# Patient Record
Sex: FEMALE | Race: BLACK OR AFRICAN AMERICAN | NOT HISPANIC OR LATINO | Employment: UNEMPLOYED | URBAN - METROPOLITAN AREA
[De-identification: names, ages, dates, MRNs, and addresses within clinical notes are randomized per-mention and may not be internally consistent; named-entity substitution may affect disease eponyms.]

---

## 2021-04-30 ENCOUNTER — TELEPHONE (OUTPATIENT)
Dept: OBSTETRICS AND GYNECOLOGY | Facility: CLINIC | Age: 31
End: 2021-04-30

## 2021-06-17 ENCOUNTER — OFFICE VISIT (OUTPATIENT)
Dept: OBSTETRICS AND GYNECOLOGY | Facility: CLINIC | Age: 31
End: 2021-06-17
Payer: MEDICAID

## 2021-06-17 VITALS
DIASTOLIC BLOOD PRESSURE: 61 MMHG | SYSTOLIC BLOOD PRESSURE: 109 MMHG | WEIGHT: 116.38 LBS | HEIGHT: 67 IN | BODY MASS INDEX: 18.27 KG/M2

## 2021-06-17 DIAGNOSIS — N94.6 DYSMENORRHEA: ICD-10-CM

## 2021-06-17 DIAGNOSIS — N92.0 MENORRHAGIA WITH REGULAR CYCLE: Primary | ICD-10-CM

## 2021-06-17 PROCEDURE — 99213 PR OFFICE/OUTPT VISIT, EST, LEVL III, 20-29 MIN: ICD-10-PCS | Mod: S$GLB,,, | Performed by: NURSE PRACTITIONER

## 2021-06-17 PROCEDURE — 99213 OFFICE O/P EST LOW 20 MIN: CPT | Mod: S$GLB,,, | Performed by: NURSE PRACTITIONER

## 2021-06-18 ENCOUNTER — PATIENT MESSAGE (OUTPATIENT)
Dept: OBSTETRICS AND GYNECOLOGY | Facility: CLINIC | Age: 31
End: 2021-06-18

## 2021-06-25 ENCOUNTER — TELEPHONE (OUTPATIENT)
Dept: OBSTETRICS AND GYNECOLOGY | Facility: CLINIC | Age: 31
End: 2021-06-25

## 2021-06-30 ENCOUNTER — TELEPHONE (OUTPATIENT)
Dept: OBSTETRICS AND GYNECOLOGY | Facility: CLINIC | Age: 31
End: 2021-06-30

## 2021-07-15 ENCOUNTER — TELEPHONE (OUTPATIENT)
Dept: OBSTETRICS AND GYNECOLOGY | Facility: CLINIC | Age: 31
End: 2021-07-15

## 2021-07-16 ENCOUNTER — OUTSIDE PLACE OF SERVICE (OUTPATIENT)
Dept: OBSTETRICS AND GYNECOLOGY | Facility: CLINIC | Age: 31
End: 2021-07-16

## 2021-07-16 PROCEDURE — 58563 HYSTEROSCOPY ABLATION: CPT | Mod: ,,, | Performed by: OBSTETRICS & GYNECOLOGY

## 2021-07-16 PROCEDURE — 58563 PR HYSTEROSCOPY,W/ENDOMETRIAL ABLATION: ICD-10-PCS | Mod: ,,, | Performed by: OBSTETRICS & GYNECOLOGY

## 2021-07-19 ENCOUNTER — PATIENT MESSAGE (OUTPATIENT)
Dept: OBSTETRICS AND GYNECOLOGY | Facility: CLINIC | Age: 31
End: 2021-07-19

## 2021-07-20 ENCOUNTER — PATIENT MESSAGE (OUTPATIENT)
Dept: OBSTETRICS AND GYNECOLOGY | Facility: CLINIC | Age: 31
End: 2021-07-20

## 2021-07-20 DIAGNOSIS — R30.0 DYSURIA: Primary | ICD-10-CM

## 2021-07-20 RX ORDER — METRONIDAZOLE 500 MG/1
500 TABLET ORAL EVERY 12 HOURS
Qty: 14 TABLET | Refills: 0 | Status: SHIPPED | OUTPATIENT
Start: 2021-07-20 | End: 2021-07-26 | Stop reason: SDUPTHER

## 2021-07-23 ENCOUNTER — PATIENT MESSAGE (OUTPATIENT)
Dept: OBSTETRICS AND GYNECOLOGY | Facility: CLINIC | Age: 31
End: 2021-07-23

## 2021-07-26 RX ORDER — METRONIDAZOLE 500 MG/1
500 TABLET ORAL EVERY 12 HOURS
Qty: 14 TABLET | Refills: 0 | Status: SHIPPED | OUTPATIENT
Start: 2021-07-26 | End: 2021-08-02

## 2021-07-27 ENCOUNTER — PATIENT MESSAGE (OUTPATIENT)
Dept: OBSTETRICS AND GYNECOLOGY | Facility: CLINIC | Age: 31
End: 2021-07-27

## 2021-07-27 RX ORDER — FLUCONAZOLE 150 MG/1
TABLET ORAL
Qty: 2 TABLET | Refills: 1 | Status: SHIPPED | OUTPATIENT
Start: 2021-07-27 | End: 2022-02-14

## 2021-07-28 ENCOUNTER — TELEPHONE (OUTPATIENT)
Dept: OBSTETRICS AND GYNECOLOGY | Facility: CLINIC | Age: 31
End: 2021-07-28

## 2021-07-28 ENCOUNTER — CLINICAL SUPPORT (OUTPATIENT)
Dept: OBSTETRICS AND GYNECOLOGY | Facility: CLINIC | Age: 31
End: 2021-07-28
Payer: MEDICAID

## 2021-07-28 DIAGNOSIS — R30.0 DYSURIA: Primary | ICD-10-CM

## 2021-07-28 PROCEDURE — 87086 URINE CULTURE/COLONY COUNT: CPT | Performed by: NURSE PRACTITIONER

## 2021-07-28 RX ORDER — NITROFURANTOIN 25; 75 MG/1; MG/1
100 CAPSULE ORAL 2 TIMES DAILY
Qty: 14 CAPSULE | Refills: 0 | Status: SHIPPED | OUTPATIENT
Start: 2021-07-28 | End: 2021-08-04

## 2021-07-30 LAB — BACTERIA UR CULT: NORMAL

## 2021-08-02 ENCOUNTER — OFFICE VISIT (OUTPATIENT)
Dept: OBSTETRICS AND GYNECOLOGY | Facility: CLINIC | Age: 31
End: 2021-08-02
Payer: MEDICAID

## 2021-08-02 VITALS
HEIGHT: 64 IN | BODY MASS INDEX: 19.97 KG/M2 | SYSTOLIC BLOOD PRESSURE: 110 MMHG | DIASTOLIC BLOOD PRESSURE: 68 MMHG | WEIGHT: 117 LBS

## 2021-08-02 DIAGNOSIS — Z09 EXAMINATION FOLLOWING SURGERY: Primary | ICD-10-CM

## 2021-08-02 DIAGNOSIS — N92.0 MENORRHAGIA WITH REGULAR CYCLE: ICD-10-CM

## 2021-08-02 PROCEDURE — 99024 POSTOP FOLLOW-UP VISIT: CPT | Mod: S$GLB,,, | Performed by: OBSTETRICS & GYNECOLOGY

## 2021-08-02 PROCEDURE — 99024 PR POST-OP FOLLOW-UP VISIT: ICD-10-PCS | Mod: S$GLB,,, | Performed by: OBSTETRICS & GYNECOLOGY

## 2021-08-05 ENCOUNTER — PATIENT MESSAGE (OUTPATIENT)
Dept: OBSTETRICS AND GYNECOLOGY | Facility: CLINIC | Age: 31
End: 2021-08-05

## 2021-08-16 ENCOUNTER — PATIENT MESSAGE (OUTPATIENT)
Dept: OBSTETRICS AND GYNECOLOGY | Facility: CLINIC | Age: 31
End: 2021-08-16

## 2021-09-11 ENCOUNTER — PATIENT MESSAGE (OUTPATIENT)
Dept: OBSTETRICS AND GYNECOLOGY | Facility: CLINIC | Age: 31
End: 2021-09-11

## 2021-09-13 ENCOUNTER — PATIENT MESSAGE (OUTPATIENT)
Dept: OBSTETRICS AND GYNECOLOGY | Facility: CLINIC | Age: 31
End: 2021-09-13

## 2021-10-07 ENCOUNTER — PATIENT MESSAGE (OUTPATIENT)
Dept: OBSTETRICS AND GYNECOLOGY | Facility: CLINIC | Age: 31
End: 2021-10-07

## 2021-12-01 ENCOUNTER — PATIENT MESSAGE (OUTPATIENT)
Dept: OBSTETRICS AND GYNECOLOGY | Facility: CLINIC | Age: 31
End: 2021-12-01
Payer: COMMERCIAL

## 2021-12-10 ENCOUNTER — TELEPHONE (OUTPATIENT)
Dept: OBSTETRICS AND GYNECOLOGY | Facility: CLINIC | Age: 31
End: 2021-12-10
Payer: COMMERCIAL

## 2021-12-16 ENCOUNTER — PATIENT MESSAGE (OUTPATIENT)
Dept: OBSTETRICS AND GYNECOLOGY | Facility: CLINIC | Age: 31
End: 2021-12-16
Payer: COMMERCIAL

## 2021-12-27 ENCOUNTER — TELEPHONE (OUTPATIENT)
Dept: OBSTETRICS AND GYNECOLOGY | Facility: CLINIC | Age: 31
End: 2021-12-27
Payer: COMMERCIAL

## 2022-01-07 ENCOUNTER — PATIENT MESSAGE (OUTPATIENT)
Dept: OBSTETRICS AND GYNECOLOGY | Facility: CLINIC | Age: 32
End: 2022-01-07
Payer: COMMERCIAL

## 2022-01-10 ENCOUNTER — PATIENT MESSAGE (OUTPATIENT)
Dept: OBSTETRICS AND GYNECOLOGY | Facility: CLINIC | Age: 32
End: 2022-01-10
Payer: COMMERCIAL

## 2022-01-10 ENCOUNTER — TELEPHONE (OUTPATIENT)
Dept: OBSTETRICS AND GYNECOLOGY | Facility: CLINIC | Age: 32
End: 2022-01-10
Payer: COMMERCIAL

## 2022-01-10 NOTE — TELEPHONE ENCOUNTER
----- Message from Niki Dorsey sent at 1/10/2022 11:41 AM CST -----  Contact: pt  Type: Needs Medical Advice         Who Called: pt  Best Call Back Number: [phone]  Additional Information: Requesting a call back regarding pt is having abdominal  pain with intercourse. Pt said pain is a 10 of 10 and would like to be seen before her next appt. Pt would like office to call her back.       Please Advise- Thank you

## 2022-01-12 ENCOUNTER — OFFICE VISIT (OUTPATIENT)
Dept: OBSTETRICS AND GYNECOLOGY | Facility: CLINIC | Age: 32
End: 2022-01-12
Payer: COMMERCIAL

## 2022-01-12 VITALS
HEIGHT: 66 IN | WEIGHT: 122.63 LBS | DIASTOLIC BLOOD PRESSURE: 58 MMHG | BODY MASS INDEX: 19.71 KG/M2 | SYSTOLIC BLOOD PRESSURE: 104 MMHG

## 2022-01-12 DIAGNOSIS — A64 STD (SEXUALLY TRANSMITTED DISEASE): Primary | ICD-10-CM

## 2022-01-12 DIAGNOSIS — N73.9 PELVIC INFLAMMATORY DISEASE: ICD-10-CM

## 2022-01-12 PROCEDURE — 87591 N.GONORRHOEAE DNA AMP PROB: CPT | Performed by: OBSTETRICS & GYNECOLOGY

## 2022-01-12 PROCEDURE — 87210 SMEAR WET MOUNT SALINE/INK: CPT | Mod: QW,S$GLB,, | Performed by: OBSTETRICS & GYNECOLOGY

## 2022-01-12 PROCEDURE — 99213 OFFICE O/P EST LOW 20 MIN: CPT | Mod: S$GLB,,, | Performed by: OBSTETRICS & GYNECOLOGY

## 2022-01-12 PROCEDURE — 87491 CHLMYD TRACH DNA AMP PROBE: CPT | Performed by: OBSTETRICS & GYNECOLOGY

## 2022-01-12 PROCEDURE — 87210 PR  SMEAR,STAIN,WET MNT,INTERP: ICD-10-PCS | Mod: QW,S$GLB,, | Performed by: OBSTETRICS & GYNECOLOGY

## 2022-01-12 PROCEDURE — 99213 PR OFFICE/OUTPT VISIT, EST, LEVL III, 20-29 MIN: ICD-10-PCS | Mod: S$GLB,,, | Performed by: OBSTETRICS & GYNECOLOGY

## 2022-01-12 RX ORDER — DOXYCYCLINE HYCLATE 100 MG
100 TABLET ORAL EVERY 12 HOURS
Qty: 28 TABLET | Refills: 0 | Status: SHIPPED | OUTPATIENT
Start: 2022-01-12 | End: 2022-02-14 | Stop reason: ALTCHOICE

## 2022-01-12 RX ORDER — CEFTRIAXONE 500 MG/1
500 INJECTION, POWDER, FOR SOLUTION INTRAMUSCULAR; INTRAVENOUS
Status: DISCONTINUED | OUTPATIENT
Start: 2022-01-12 | End: 2022-03-16

## 2022-01-12 NOTE — PROGRESS NOTES
Patient ID: Alla Andre is a 31 y.o. female.    Chief Complaint:  Pelvic Pain (Pt has been experiencing pelvic and back pain for one week before/ after intercourse. Pt had  ablation procedure . LMP unknown)      History of Present Illness  HPI  Patient states she started having pain about a week ago in her lower abdomen.  Pain is worse with intercourse and deep penetration.  She does report vaginal discharge after intercourse.  There is a slight odor to it.  No nausea or vomiting.  No fevers.  She had an endometrial ablation and has not had any bleeding since she does report some mild spotting after intercourse as well.  The pain is in her lower abdomen diffusely.  Crampy at times but sharp when palpated or during intercourse.  She is sexually active with 1 partner.  She does not suspect any infidelity from her partner either.    GYN & OB History  No LMP recorded (lmp unknown). Patient has had an ablation.   Date of Last Pap: No result found    Past Medical History:   Diagnosis Date    Abnormal Pap smear of cervix      Past Surgical History:   Procedure Laterality Date    ABLATION      2021    TUBAL LIGATION       Review of patient's allergies indicates:   Allergen Reactions    Shellfish containing products      Other reaction(s): Swelling     Current Outpatient Medications on File Prior to Visit   Medication Sig Dispense Refill    fluconazole (DIFLUCAN) 150 MG Tab 1 po Now may repeat in 72 hr (Patient not taking: No sig reported) 2 tablet 1     No current facility-administered medications on file prior to visit.       OB History    Para Term  AB Living   1 1 1     1   SAB IAB Ectopic Multiple Live Births           1      # Outcome Date GA Lbr Roosevelt/2nd Weight Sex Delivery Anes PTL Lv   1 Term [birth year]   3.26 kg (7 lb 3 oz) F Vag-Spont EPI  SILVIO       Review of Systems  Review of Systems   Constitutional: Negative for fatigue, fever and unexpected weight change.   HENT: Negative for  "nasal congestion.    Respiratory: Negative for cough and shortness of breath.    Cardiovascular: Negative for chest pain, palpitations and leg swelling.   Gastrointestinal: Negative for abdominal pain, constipation, diarrhea, nausea, vomiting and reflux.   Endocrine: Negative for diabetes, hair loss and hot flashes.   Genitourinary: Positive for pelvic pain and vaginal discharge. Negative for bladder incontinence, decreased libido, dysmenorrhea, dyspareunia, dysuria, hot flashes, menorrhagia, menstrual problem and vaginal dryness.   Musculoskeletal: Negative for arthralgias, back pain and myalgias.   Integumentary:  Negative for rash, acne, hair changes, mole/lesion, breast mass, nipple discharge, breast skin changes and breast tenderness.   Neurological: Negative for seizures, syncope and headaches.   Hematological: Negative for adenopathy. Does not bruise/bleed easily.   Psychiatric/Behavioral: Negative for depression and sleep disturbance. The patient is not nervous/anxious.    Breast: Negative for breast self exam, lump, mass, mastodynia, nipple discharge, skin changes and tenderness              Objective:   BP (!) 104/58   Ht 5' 6" (1.676 m)   Wt 55.6 kg (122 lb 9.6 oz)   LMP  (LMP Unknown)   BMI 19.79 kg/m²        Physical Exam:   Constitutional: She is oriented to person, place, and time. She appears well-developed and well-nourished. No distress.    HENT:   Head: Normocephalic and atraumatic.   Nose: Nose normal.            Abdominal: Soft. She exhibits no distension and no mass. There is no abdominal tenderness.             Musculoskeletal: Normal range of motion and moves all extremeties. No edema.       Neurological: She is alert and oriented to person, place, and time. No cranial nerve deficit.     Psychiatric: She has a normal mood and affect. Her behavior is normal. Judgment and thought content normal.   :  Normal external female genitalia, cervix midline without lesions, uterus normal position " and size, no adnexal masses. Vaginal discharge noted, cervical motion tenderness    Wet prep with multiple WBC noted, no trich or clue cells.          Assessment:      1. STD (sexually transmitted disease)    2. Pelvic inflammatory disease          Plan:      Orders Placed This Encounter   Procedures    C. trachomatis/N. gonorrhoeae by AMP DNA Other; Urine     Sources by Resulting Lab:->Other  Release to patient->Immediate     Order Specific Question:   Sources by Resulting Lab:     Answer:   Other     Order Specific Question:   Source:     Answer:   Urine     Order Specific Question:   Release to patient     Answer:   Immediate        patient with suspected PID.  Will treat with Rocephin and discharge with doxycycline home.  Precautions for nausea vomiting and compliance was discussed with the patient expressed understanding.  Return 1 week.  Consider ultrasound if no improvement.  Follow-up urine cultures.

## 2022-01-14 ENCOUNTER — PATIENT MESSAGE (OUTPATIENT)
Dept: OBSTETRICS AND GYNECOLOGY | Facility: CLINIC | Age: 32
End: 2022-01-14
Payer: COMMERCIAL

## 2022-01-18 LAB
C TRACH DNA SPEC QL NAA+PROBE: NOT DETECTED
N GONORRHOEA DNA SPEC QL NAA+PROBE: NOT DETECTED

## 2022-01-18 NOTE — TELEPHONE ENCOUNTER
LVM stating that I was following up on her [employer] message wanting to know what tests she is referring to and to call us back if she has any further questions.

## 2022-02-03 ENCOUNTER — PATIENT MESSAGE (OUTPATIENT)
Dept: OBSTETRICS AND GYNECOLOGY | Facility: CLINIC | Age: 32
End: 2022-02-03
Payer: COMMERCIAL

## 2022-02-08 ENCOUNTER — PATIENT MESSAGE (OUTPATIENT)
Dept: OBSTETRICS AND GYNECOLOGY | Facility: CLINIC | Age: 32
End: 2022-02-08

## 2022-02-09 NOTE — TELEPHONE ENCOUNTER
Please schedule patient to come see me at next available.  She is scheduled to see Paulina on 2/21.  She needs a UPT and TVUS ASAP.

## 2022-02-10 ENCOUNTER — PATIENT MESSAGE (OUTPATIENT)
Dept: OBSTETRICS AND GYNECOLOGY | Facility: CLINIC | Age: 32
End: 2022-02-10
Payer: COMMERCIAL

## 2022-02-14 ENCOUNTER — PROCEDURE VISIT (OUTPATIENT)
Dept: OBSTETRICS AND GYNECOLOGY | Facility: CLINIC | Age: 32
End: 2022-02-14
Payer: COMMERCIAL

## 2022-02-14 ENCOUNTER — OFFICE VISIT (OUTPATIENT)
Dept: OBSTETRICS AND GYNECOLOGY | Facility: CLINIC | Age: 32
End: 2022-02-14
Payer: COMMERCIAL

## 2022-02-14 VITALS
SYSTOLIC BLOOD PRESSURE: 106 MMHG | BODY MASS INDEX: 19.38 KG/M2 | WEIGHT: 120.56 LBS | HEIGHT: 66 IN | DIASTOLIC BLOOD PRESSURE: 65 MMHG

## 2022-02-14 DIAGNOSIS — N30.01 ACUTE CYSTITIS WITH HEMATURIA: ICD-10-CM

## 2022-02-14 DIAGNOSIS — R19.8 ABDOMINAL GUARDING: ICD-10-CM

## 2022-02-14 DIAGNOSIS — R10.2 PELVIC PAIN: ICD-10-CM

## 2022-02-14 DIAGNOSIS — R10.2 PELVIC PAIN: Primary | ICD-10-CM

## 2022-02-14 PROBLEM — A63.0 ANOGENITAL HUMAN PAPILLOMA VIRUS (HPV) INFECTION: Status: ACTIVE | Noted: 2022-02-14

## 2022-02-14 PROBLEM — E78.00 HYPERCHOLESTEROLEMIA: Status: ACTIVE | Noted: 2022-02-14

## 2022-02-14 LAB
B-HCG UR QL: NEGATIVE
BILIRUB SERPL-MCNC: NEGATIVE MG/DL
BLOOD URINE, POC: ABNORMAL
COLOR, POC UA: YELLOW
CTP QC/QA: YES
GLUCOSE UR QL STRIP: NEGATIVE
KETONES UR QL STRIP: NEGATIVE
LEUKOCYTE ESTERASE URINE, POC: ABNORMAL
NITRITE, POC UA: NEGATIVE
PH, POC UA: 5.5
PROTEIN, POC: NEGATIVE
SPECIFIC GRAVITY, POC UA: 1.03
UROBILINOGEN, POC UA: 0.2

## 2022-02-14 PROCEDURE — 76830 TRANSVAGINAL US NON-OB: CPT | Mod: S$GLB,,, | Performed by: OBSTETRICS & GYNECOLOGY

## 2022-02-14 PROCEDURE — 81025 URINE PREGNANCY TEST: CPT | Mod: S$GLB,,,

## 2022-02-14 PROCEDURE — 81025 POCT URINE PREGNANCY: ICD-10-PCS | Mod: S$GLB,,,

## 2022-02-14 PROCEDURE — 81001 POCT URINALYSIS, DIPSTICK OR TABLET REAGENT, AUTOMATED, WITH MICROSCOP: ICD-10-PCS | Mod: S$GLB,,,

## 2022-02-14 PROCEDURE — 76830 US OB/GYN PROCEDURE (VIEWPOINT): ICD-10-PCS | Mod: S$GLB,,, | Performed by: OBSTETRICS & GYNECOLOGY

## 2022-02-14 PROCEDURE — 99215 PR OFFICE/OUTPT VISIT, EST, LEVL V, 40-54 MIN: ICD-10-PCS | Mod: 25,S$GLB,,

## 2022-02-14 PROCEDURE — 81001 URINALYSIS AUTO W/SCOPE: CPT | Mod: S$GLB,,,

## 2022-02-14 PROCEDURE — 99215 OFFICE O/P EST HI 40 MIN: CPT | Mod: 25,S$GLB,,

## 2022-02-14 RX ORDER — METRONIDAZOLE 500 MG/1
500 TABLET ORAL 2 TIMES DAILY
Qty: 28 TABLET | Refills: 0 | Status: SHIPPED | OUTPATIENT
Start: 2022-02-14 | End: 2022-02-28

## 2022-02-14 RX ORDER — CEFUROXIME AXETIL 500 MG/1
500 TABLET ORAL 2 TIMES DAILY
COMMUNITY
Start: 2022-02-09 | End: 2022-02-16

## 2022-02-14 NOTE — PROGRESS NOTES
SUBJECTIVE:       Chief Complaint: Possible Pregnancy    History of Present Illness: Sury Andre is a 31 y.o. female  presenting for persistent pelvic pain.  Patient does have a history of STD/STI.  She does not desire STD/STI testing today.  She states she feels safe at home.  She has a BTL+GTA for contraception as of 2021.  She was recently seen at [facility] for a UTI.  She states she is still taking antibiotics from this encounter.  Patient was given antibiotics for PIC in early January by Dr. Gallardo and did not follow-up as recommended.  She reports diffuse pelvic pain and pain with sex.  She describes the pain as moderate aching and stabbing.    Review of Systems   Constitutional: Negative for activity change, appetite change, chills, fatigue, fever and unexpected weight change.   HENT: Negative for nasal congestion, dental problem, sinus pressure/congestion and sore throat.    Eyes: Negative for discharge, visual disturbance and eye dryness.   Respiratory: Negative for cough, chest tightness and shortness of breath.    Cardiovascular: Negative for chest pain, palpitations and leg swelling.   Gastrointestinal: Negative for abdominal distention, abdominal pain, change in bowel habit, constipation, diarrhea, nausea, vomiting, reflux and change in bowel habit.   Endocrine: Negative for cold intolerance, heat intolerance, polydipsia and polyuria.   Genitourinary: Positive for dyspareunia and pelvic pain. Negative for difficulty urinating, dysuria, flank pain, frequency, urgency, vaginal bleeding, vaginal discharge, vaginal pain and vaginal dryness.   Musculoskeletal: Negative for back pain and myalgias.   Integumentary:  Negative for rash, breast mass, breast discharge and breast tenderness.   Allergic/Immunologic: Negative for immunocompromised state.   Neurological: Negative for dizziness, syncope, weakness, light-headedness, numbness and headaches.   Hematological: Negative for adenopathy.  "Does not bruise/bleed easily.   Psychiatric/Behavioral: Negative for sleep disturbance. The patient is not nervous/anxious.    Breast: Negative for mass and tenderness        OBJECTIVE:      /65 (BP Location: Left arm, Patient Position: Sitting)   Ht 5' 6" (1.676 m)   Wt 54.7 kg (120 lb 9.5 oz)   LMP 06/21/2021 (Exact Date)   BMI 19.46 kg/m²     Chaperone present.    Physical Exam:   Constitutional: She is oriented to person, place, and time. Vital signs are normal. She appears well-developed and well-nourished. She is cooperative.    HENT:   Head: Normocephalic and atraumatic.      Cardiovascular: Normal rate and regular rhythm.     Pulmonary/Chest: Effort normal.        Abdominal: Soft. She exhibits no distension. There is abdominal tenderness in the right lower quadrant, suprapubic area and left lower quadrant. There is guarding.             Musculoskeletal: Moves all extremeties.      Right lower leg: No edema.      Left lower leg: No edema.       Neurological: She is alert and oriented to person, place, and time. GCS eye subscore is 4. GCS verbal subscore is 5. GCS motor subscore is 6.    Skin: Skin is warm and dry. Capillary refill takes less than 2 seconds.    Psychiatric: Her speech is normal and behavior is normal. Mood, affect and thought content normal.         ASSESSMENT:       1. Pelvic pain    2. Abdominal guarding    3. Acute cystitis with hematuria          PLAN:     Pelvic pain  -     US OB/GYN Procedure ([facility])-Future; Future; Expected date: 02/14/2022  -     POCT urine pregnancy  -     metroNIDAZOLE (FLAGYL) 500 MG tablet; Take 1 tablet (500 mg total) by mouth 2 (two) times daily. for 14 days  Dispense: 28 tablet; Refill: 0    Abdominal guarding    Acute cystitis with hematuria  -     POCT URINE DIPSTICK WITH MICROSCOPE, AUTOMATED    Ultrasound today to rule out TOA.  Discussed importance of taking antibiotic therapy as prescribed for the full dose.  Advised patient to abstain from " alcohol while taking metronidazole.  Instructed patient to notify provider and/or go to ED if fever, chills, or worsening pelvic pain while taking antibiotics.  If pelvic pain persists or is worse on metronidazole by end of week, will admit to [facility] for IV antibiotics for PID per Dr. Gallardo.  Reassurance provided.  All questions answered.  Patient verbalized understanding.      Follow up in about 3 days (around 2/17/2022) for evaluation of symptoms.

## 2022-02-14 NOTE — PATIENT INSTRUCTIONS
Patient Education       Pelvic Pain   About this topic   Pelvic pain is pain below the belly button and above the legs. It may be acute and last a few hours or a few days or much longer. If the pain lasts longer than 3 months it is chronic pelvic pain.  Your pain may be sharp, dull, or cramping. It may be there all the time or may come and go. Sometimes, the pain builds up over a short time. You may feel the pain throughout this area of your body or in one specific area. It may be mild, moderate, or severe.  Pain can cause upset stomach and throwing up. When you are in pain you may not feel hungry. You may feel nervous. Pain may be a warning sign that something is wrong inside the body. Acute pelvic pain may be caused by a very serious health problem.  How your pelvic pain is treated is based on many things. Some of them are the kind of pain, how bad it is, and what is causing the pain. Treatment may include drugs or surgery.  What are the causes?   Problems in the belly or bowels like:  · Blocked bowel  · Appendicitis  · Diverticulitis  · Hernia  · Hard stools  · Irritable bowel syndrome  · Peritonitis  Problems in the urinary tract like:  · Urinary tract infection  · Kidney or bladder stones  · Narrowing of the urethra  Other causes like:  · Muscle strain or spasm  · Herniated disc  · Bone problems  · Hernias  · Pelvic abscess  · Pelvic inflammatory disease  · Sexually transmitted disease  · Nerve problems  · Adhesions after surgery  · IUD is in the wrong place  Problems with internal reproductive organs like:  · Miscarriage or ectopic pregnancy  · Ovarian cyst breaks open  · Vaginal infection  · Pelvic inflammatory disease or sexually transmitted disease  · Problems with monthly periods  · Ovarian or testicular torsion  · Endometriosis or fibroids  · Ovulation pain  · Prostate problems  · Pain after a permanent birth control surgery  What are the main signs?   · Tenderness in lower belly  · Fever  · Upset stomach  and throwing up  · Dizziness  · Sweating  · Feeling anxious  How does the doctor diagnose this health problem?   Your doctor will take your history and do an exam. If you have a vagina, this will likely include a pelvic exam with a tool called a speculum. Your doctor will ask about your pain to help find where it is located. The doctor may want to do some tests to find the cause of your pain. The doctor may order:  · Lab tests  · Pelvic exam  · Ultrasound  · X-ray  · CT or MRI scan  · Intravenous pyelography  · Barium enema  · Laparoscopy  How does the doctor treat this health problem?   Treatment is based on what is causing your pain. This may include changes in your diet or physical or sexual activity.  Are there other health problems to treat?   Depending on what is causing the pain, there may be other problems to treat.  What drugs may be needed?   The doctor may order drugs to:  · Help with pain  · Fight an infection  · Balance hormones  · Relax muscles  · Lower stress and anxiety or help with depression  What can be done to prevent this health problem?   · There is nothing you can do to prevent some of these problems.  · Always practice safe sex by using condoms.  · Manage digestive problems like constipation.  Where can I learn more?   NHS Choices  http://www.nhs.uk/conditions/pelvic-pain/Pages/Introduction.aspx   Last Reviewed Date   2021-09-14  Consumer Information Use and Disclaimer   This information is not specific medical advice and does not replace information you receive from your health care provider. This is only a brief summary of general information. It does NOT include all information about conditions, illnesses, injuries, tests, procedures, treatments, therapies, discharge instructions or life-style choices that may apply to you. You must talk with your health care provider for complete information about your health and treatment options. This information should not be used to decide whether or  not to accept your health care providers advice, instructions or recommendations. Only your health care provider has the knowledge and training to provide advice that is right for you.  Copyright   Copyright © 2021 [employer] and its affiliates and/or licensors. All rights reserved.

## 2022-02-16 ENCOUNTER — PATIENT MESSAGE (OUTPATIENT)
Dept: OBSTETRICS AND GYNECOLOGY | Facility: CLINIC | Age: 32
End: 2022-02-16
Payer: COMMERCIAL

## 2022-02-16 NOTE — TELEPHONE ENCOUNTER
Attempted to reach pt to inform of Benita's instruction. No answer. LVM reading Benita's message to the pt.

## 2022-02-16 NOTE — TELEPHONE ENCOUNTER
Pt called the call center. I returned her call and verified that she was aware of Benita's instructions. Pt CLARK. Pt inquired about ED wait time, I notified the patient that it could depend on what they find and if there is a high volume of patients. Pt CLARK.

## 2022-02-17 ENCOUNTER — TELEPHONE (OUTPATIENT)
Dept: OBSTETRICS AND GYNECOLOGY | Facility: CLINIC | Age: 32
End: 2022-02-17
Payer: COMMERCIAL

## 2022-02-17 ENCOUNTER — PATIENT MESSAGE (OUTPATIENT)
Dept: OBSTETRICS AND GYNECOLOGY | Facility: CLINIC | Age: 32
End: 2022-02-17
Payer: COMMERCIAL

## 2022-02-17 DIAGNOSIS — N92.0 MENORRHAGIA WITH REGULAR CYCLE: Primary | ICD-10-CM

## 2022-02-17 NOTE — TELEPHONE ENCOUNTER
----- Message from Bibi Munoz sent at 2/17/2022  8:06 AM CST -----  Contact: pt  Type: Needs Medical Advice    Who Called:  the patient  Best Call Back Number: [phone]    Additional Information: Requesting a call back regarding went to hospital last night and saw some blood# in vagina when they did an ultra sound.   Please Advise ---Thank you

## 2022-02-18 ENCOUNTER — OFFICE VISIT (OUTPATIENT)
Dept: OBSTETRICS AND GYNECOLOGY | Facility: CLINIC | Age: 32
End: 2022-02-18
Payer: COMMERCIAL

## 2022-02-18 VITALS
SYSTOLIC BLOOD PRESSURE: 121 MMHG | HEIGHT: 66 IN | DIASTOLIC BLOOD PRESSURE: 68 MMHG | WEIGHT: 120.81 LBS | BODY MASS INDEX: 19.42 KG/M2

## 2022-02-18 DIAGNOSIS — N83.209 HEMORRHAGIC OVARIAN CYST: ICD-10-CM

## 2022-02-18 DIAGNOSIS — N92.0 MENORRHAGIA WITH REGULAR CYCLE: Primary | ICD-10-CM

## 2022-02-18 PROCEDURE — 99213 OFFICE O/P EST LOW 20 MIN: CPT | Mod: S$GLB,,,

## 2022-02-18 PROCEDURE — 99213 PR OFFICE/OUTPT VISIT, EST, LEVL III, 20-29 MIN: ICD-10-PCS | Mod: S$GLB,,,

## 2022-02-18 RX ORDER — ETONOGESTREL AND ETHINYL ESTRADIOL VAGINAL RING .015; .12 MG/D; MG/D
1 RING VAGINAL
Qty: 1 EACH | Refills: 2 | Status: SHIPPED | OUTPATIENT
Start: 2022-02-18 | End: 2022-02-18 | Stop reason: ALTCHOICE

## 2022-02-18 RX ORDER — NORELGESTROMIN AND ETHINYL ESTRADIOL 35; 150 UG/MG; UG/MG
1 PATCH TRANSDERMAL
Qty: 4 PATCH | Refills: 11 | Status: SHIPPED | OUTPATIENT
Start: 2022-02-18 | End: 2022-06-10

## 2022-02-18 RX ORDER — NAPROXEN 250 MG/1
500 TABLET ORAL
COMMUNITY
Start: 2022-02-16 | End: 2022-02-26

## 2022-02-18 NOTE — LETTER
February 18, 2022      [facility]  [address] [place] [address]  Phone: [phone]  Fax: [phone]       Patient: Alla Andre   YOB: 1990  Date of Visit: 02/18/2022    To Whom It May Concern:    Tonio Andre  was at [facility] on 02/14/2022 and  02/18/2022. She may return to work/school on 02/21/2022 with no restrictions.    If you have any questions or concerns, or if I can be of further assistance, please do not hesitate to contact me.    Sincerely,      JUVENTINO Estrada, FNP-C, CLC

## 2022-02-18 NOTE — PROGRESS NOTES
Patient requested prescription change from NuvaRing to transdermal patch.  Rx sent.  Patient notified.

## 2022-02-18 NOTE — PATIENT INSTRUCTIONS
"Patient Education       Heavy Periods   The Basics   Written by the doctors and editors at [facility]   What causes heavy periods? -- That depends on your body and individual situation. There might be nothing wrong with you at all. But sometimes, heavy periods are a sign that there is a problem.  Things that can cause heavy periods include:  · One of your ovaries not releasing an egg during one or more months  · Growths in the uterus called "fibroids"  · A bleeding disorder that prevents your blood from clotting normally  · Side effects of some medicines, such as some types of birth control or blood thinners  · A problem with your thyroid (a gland that makes hormones)  · Cancer of the uterus  How much bleeding is normal when I have my period? -- During a normal period, bleeding lasts less than 8 days. Signs that your periods are too heavy include:  · Having to change a pad or tampon every 1 or 2 hours because it is completely soaked  · Passing large lumps of blood, called clots  Is my bleeding an emergency? -- See your doctor or go to the emergency room right away if you soak through 4 or more pads or tampons in 2 hours.  If you are pregnant and have any bleeding, tell your doctor or midwife right away. Bleeding during pregnancy can sometimes be a sign of an emergency condition.  Should I see a doctor or nurse? -- Call your doctor or nurse if you:  · Are pregnant or think you could be pregnant  · Have a period that lasts for more than 8 days  · Soak through a pad or tampon every 1 or 2 hours, and this happens every time you have a period  · Need to use both pads and tampons at the same time because you are bleeding so much  · Need to change your pad or tampon during the night  · Pass clots that are bigger than 1 inch wide  · Bleed in between periods  · Have irregular periods that happen more or less often than once a month  · Have pain and bad cramps in your lower belly before or while you are bleeding  · Are having " trouble getting pregnant  · Have bleeding after you have not had periods for at least a year, and think you have gone through menopause  · Have any of symptoms listed above and are low in iron. Signs of being low in iron include:  ? Feeling weak  ? Feeling very tired  ? Having headaches  ? Having trouble breathing when you exercise  ? Feeling your heart beat too fast when you exercise  Are there tests I should have? -- Your doctor or nurse will decide if you need tests based on your age, symptoms, and individual situation. There are lots of tests, but you might not need any.  Here are the most common tests doctors use to find the cause of heavy periods:  · Blood tests - Blood tests can check if you are pregnant, or have hormonal changes, a bleeding disorder, low iron levels, or other problems.  · Endometrial biopsy - For this test, the doctor will take a sample of tissue from inside your uterus. The sample can be viewed under a microscope to look for problems.  · Pelvic ultrasound - This test uses sound waves to make a picture of your uterus, ovaries, and vagina. The pictures can show if you have fibroids or other growths.  · Hysteroscopy - For this test, the doctor will use a small instrument to look inside your uterus.  How are heavy periods treated? -- That depends on what is causing your heavy periods and whether you want to get pregnant soon. You might not need treatment. If you do, treatments might include:  · Birth control methods that use hormones - These make your period lighter or stop your periods completely. They come as:  ? Pills  ? Skin patches  ? A ring that you put inside your vagina  ? Shots that you get every 3 months  ? An intrauterine device (IUD), a device that your doctor inserts into your uterus  · Medicines that thicken blood and slow bleeding  · Medicines that reduce swelling, such as ibuprofen (sample brand names: Motrin, Advil) or mefenamic acid (brand name: Ponstel)  · Medicines that  "contain a hormone called "progestin." These are taken for a week or so every few months.  · Medicines that make the ovaries stop working for a short time  If you have fibroids, or if medicines haven't helped with your heavy periods, your doctor might suggest surgery. Your options will depend on whether you might want to get pregnant in the future. They might include:  · The removal of fibroids or other growths  · Endometrial ablation - This is a procedure that causes scarring in the inner lining of the uterus.  · Uterine artery embolization - This is a procedure to block the blood vessels that supply blood to the uterus.  · Hysterectomy - This is surgery to remove the uterus. After a hysterectomy, you will no longer have periods at all.  All topics are updated as new evidence becomes available and our peer review process is complete.  This topic retrieved from [employer] on: Sep 21, 2021.  Topic 06489 Version 10.0  Release: 29.4.2 - C29.263  © 2021 [employer], Inc[employer] and/or its affiliates. All rights reserved.  Consumer Information Use and Disclaimer   This information is not specific medical advice and does not replace information you receive from your health care provider. This is only a brief summary of general information. It does NOT include all information about conditions, illnesses, injuries, tests, procedures, treatments, therapies, discharge instructions or life-style choices that may apply to you. You must talk with your health care provider for complete information about your health and treatment options. This information should not be used to decide whether or not to accept your health care provider's advice, instructions or recommendations. Only your health care provider has the knowledge and training to provide advice that is right for you. The use of this information is governed by the [employer] End User License Agreement, available at https://www.[employer]/en/solutions/[employer]ill/about/pierre.The use " of [employer] content is governed by the [employer] Terms of Use. ©2021 [employer] Inc[employer] All rights reserved.  Copyright   © 2021 [employer] and/or its affiliates. All rights reserved.

## 2022-02-18 NOTE — PROGRESS NOTES
"SUBJECTIVE:       Chief Complaint: No chief complaint on file.    History of Present Illness: Sury Andre is a 31 y.o. female  presenting for persistent pelvic pain, irregular uterine bleeding, and hospital follow-up.  Patient presently taking metronidazole.  She was seen at [facility] for persistent pelvic pain and "fever."  Patient reports she was told her "labs looked good" and was discharged to home.  Patient denies fever, chills, or worsening pelvic pain at this time.  Recent ultrasound shows possible hemorrhagic cyst on right ovary.    Review of Systems   Constitutional: Negative for activity change, appetite change, chills, fatigue, fever and unexpected weight change.   HENT: Negative for nasal congestion, dental problem, sinus pressure/congestion and sore throat.    Eyes: Negative for discharge, visual disturbance and eye dryness.   Respiratory: Negative for cough, chest tightness and shortness of breath.    Cardiovascular: Negative for chest pain, palpitations and leg swelling.   Gastrointestinal: Negative for abdominal distention, abdominal pain, change in bowel habit, constipation, diarrhea, nausea, vomiting, reflux and change in bowel habit.   Endocrine: Negative for cold intolerance, heat intolerance, polydipsia and polyuria.   Genitourinary: Positive for menstrual problem, pelvic pain and vaginal bleeding. Negative for difficulty urinating, dysuria, flank pain, frequency, urgency, vaginal discharge, vaginal pain and vaginal dryness.   Musculoskeletal: Negative for back pain and myalgias.   Integumentary:  Negative for rash, breast mass, breast discharge and breast tenderness.   Allergic/Immunologic: Negative for immunocompromised state.   Neurological: Negative for dizziness, syncope, weakness, light-headedness, numbness and headaches.   Hematological: Negative for adenopathy. Does not bruise/bleed easily.   Psychiatric/Behavioral: Negative for sleep disturbance. The patient is not " "nervous/anxious.    Breast: Negative for mass and tenderness        OBJECTIVE:      /68 (BP Location: Left arm, Patient Position: Sitting)   Ht 5' 6" (1.676 m)   Wt 54.8 kg (120 lb 12.8 oz)   LMP  (LMP Unknown)   BMI 19.50 kg/m²     Chaperone present.    Physical Exam:   Constitutional: She is oriented to person, place, and time. Vital signs are normal. She appears well-developed and well-nourished. She is cooperative.    HENT:   Head: Normocephalic and atraumatic.      Cardiovascular: Normal rate and regular rhythm.     Pulmonary/Chest: Effort normal.        Abdominal: Soft. She exhibits no distension. There is abdominal tenderness in the right lower quadrant.             Musculoskeletal: Moves all extremeties.      Right lower leg: No edema.      Left lower leg: No edema.       Neurological: She is alert and oriented to person, place, and time. GCS eye subscore is 4. GCS verbal subscore is 5. GCS motor subscore is 6.    Skin: Skin is warm and dry. Capillary refill takes less than 2 seconds.    Psychiatric: Her speech is normal and behavior is normal. Mood, affect and thought content normal.         ASSESSMENT:       1. Menorrhagia with regular cycle    2. Hemorrhagic ovarian cyst          PLAN:     Menorrhagia with regular cycle  -     etonogestreL-ethinyl estradioL (NUVARING) 0.12-0.015 mg/24 hr vaginal ring; Place 1 each vaginally every 21 days. Insert one (1) ring vaginally and leave in place for three (3) weeks, then remove for one (1) week.  Dispense: 1 each; Refill: 2    Hemorrhagic ovarian cyst    Continue metronidazole as prescribed.  Encouraged NSAIDs OTC for pelvic pain.  Discussed hormonal contraceptive options to treat abnormal bleeding and help prevent future cysts.  Patient counseled today regarding contraceptive options including oral contraceptive pills, NuvaRing, transdermal patch, depo-medroxyprogesterone injection, intrauterine device, Nexplanon.  Risks, benefits, and alternatives of " all these were discussed at length.  Patient has chosen NuvaRing vaginal inserts.  Administration compliance discussed.  Patient understands this does not protect against STDs/STIs and that the only current method of protection against STDs/STIs is condom use.  Educated on yearly STD screenings.  ED precautions given.  Reassurance provided.  All questions answered.  Patient verbalized understanding.      Follow up in about 6 weeks (around 4/1/2022), or if symptoms worsen or fail to improve, for annual wellness exam.

## 2022-02-28 ENCOUNTER — TELEPHONE (OUTPATIENT)
Dept: OBSTETRICS AND GYNECOLOGY | Facility: CLINIC | Age: 32
End: 2022-02-28
Payer: COMMERCIAL

## 2022-02-28 ENCOUNTER — PATIENT MESSAGE (OUTPATIENT)
Dept: OBSTETRICS AND GYNECOLOGY | Facility: CLINIC | Age: 32
End: 2022-02-28
Payer: COMMERCIAL

## 2022-03-04 ENCOUNTER — OFFICE VISIT (OUTPATIENT)
Dept: OBSTETRICS AND GYNECOLOGY | Facility: CLINIC | Age: 32
End: 2022-03-04
Payer: COMMERCIAL

## 2022-03-04 VITALS
OXYGEN SATURATION: 99 % | RESPIRATION RATE: 18 BRPM | DIASTOLIC BLOOD PRESSURE: 78 MMHG | HEART RATE: 88 BPM | SYSTOLIC BLOOD PRESSURE: 129 MMHG | HEIGHT: 66 IN | BODY MASS INDEX: 19.44 KG/M2 | WEIGHT: 121 LBS

## 2022-03-04 DIAGNOSIS — N94.6 DYSMENORRHEA: ICD-10-CM

## 2022-03-04 DIAGNOSIS — N92.0 MENORRHAGIA WITH REGULAR CYCLE: ICD-10-CM

## 2022-03-04 DIAGNOSIS — R10.2 PELVIC PAIN: Primary | ICD-10-CM

## 2022-03-04 PROCEDURE — 99213 OFFICE O/P EST LOW 20 MIN: CPT | Mod: S$GLB,,, | Performed by: OBSTETRICS & GYNECOLOGY

## 2022-03-04 PROCEDURE — 99213 PR OFFICE/OUTPT VISIT, EST, LEVL III, 20-29 MIN: ICD-10-PCS | Mod: S$GLB,,, | Performed by: OBSTETRICS & GYNECOLOGY

## 2022-03-04 NOTE — PROGRESS NOTES
Patient ID: Alla Andre is a 31 y.o. female.    Chief Complaint:  Abdominal Pain (Patient stated she is having sharp abd pains since she had an ablation 1/2021,and was told she had blood on her right ovary like she was about to have a cycle, she has not had a cycle yet just been spotting and is currently on the BC patch)      History of Present Illness  HPI  Patient presents for follow-up and continued management of chronic pelvic pain.  She has been treated with multiple courses of antibiotics for presumed pelvic inflammatory disease however all cultures have been within normal limits.  She is now on the contraceptive patch and denies any improvement.  She states she continues to have worsening pelvic pain over the last year.  Her pain is worse with intercourse and movement.  It is on her right side radiates down to her pelvis.  She has been seen in the ER for multiple visits as well as in the clinic for multiple follow-up appointments and no improvement.  Pain is not cyclic is constant.  She also reports heavy cycles and pain both before during and after her cycles.  The pain is worse before her cycles but does not improve after her cycle.  She is sexually active however due to the pain she is unable to be sexually active at the moment.  She states her periods are irregular but only by a week or so.  Her bleeding is typically heavy and she bleeds through her clothes to her pads most of the time.  The pain is worse during her cycles and anti-inflammatories do not help.  She has been on contraceptive in the past however has not helped as well.    GYN & OB History  No LMP recorded (lmp unknown).   Date of Last Pap: No result found    Past Medical History:   Diagnosis Date    Abnormal Pap smear of cervix      Past Surgical History:   Procedure Laterality Date    ABLATION      7/2021    TUBAL LIGATION       Review of patient's allergies indicates:   Allergen Reactions    Shellfish containing products  Swelling     Other reaction(s): Swelling     Current Outpatient Medications on File Prior to Visit   Medication Sig Dispense Refill    norelgestromin-ethinyl estradiol (ORTHO EVRA) 150-35 mcg/24 hr Place 1 patch onto the skin every 7 days. 4 patch 11     Current Facility-Administered Medications on File Prior to Visit   Medication Dose Route Frequency Provider Last Rate Last Admin    cefTRIAXone injection 500 mg  500 mg Intramuscular 1 time in Clinic/HOD Yosvany Gallardo Jr., MD           OB History    Para Term  AB Living   1 1 1     1   SAB IAB Ectopic Multiple Live Births           1      # Outcome Date GA Lbr Roosevelt/2nd Weight Sex Delivery Anes PTL Lv   1 Term 11/15/92   3.26 kg (7 lb 3 oz) F Vag-Spont EPI  SILVIO       Review of Systems  Review of Systems   Constitutional: Negative for fatigue, fever and unexpected weight change.   HENT: Negative for nasal congestion.    Respiratory: Negative for cough and shortness of breath.    Cardiovascular: Negative for chest pain, palpitations and leg swelling.   Gastrointestinal: Negative for abdominal pain, constipation, diarrhea, nausea, vomiting and reflux.   Endocrine: Negative for diabetes, hair loss and hot flashes.   Genitourinary: Positive for dysmenorrhea, dyspareunia, menorrhagia and pelvic pain. Negative for bladder incontinence, decreased libido, dysuria, hot flashes, menstrual problem, vaginal discharge and vaginal dryness.   Musculoskeletal: Negative for arthralgias, back pain and myalgias.   Integumentary:  Negative for rash, acne, hair changes, mole/lesion, breast mass, nipple discharge, breast skin changes and breast tenderness.   Neurological: Negative for seizures, syncope and headaches.   Hematological: Negative for adenopathy. Does not bruise/bleed easily.   Psychiatric/Behavioral: Negative for depression and sleep disturbance. The patient is not nervous/anxious.    Breast: Negative for breast self exam, lump, mass, mastodynia, nipple  "discharge, skin changes and tenderness              Objective:   /78   Pulse 88   Resp 18   Ht 5' 6" (1.676 m)   Wt 54.9 kg (121 lb)   LMP  (LMP Unknown) Comment: Spotting with ablation  SpO2 99%   BMI 19.53 kg/m²        Physical Exam:   Constitutional: She is oriented to person, place, and time. She appears well-developed and well-nourished. No distress.    HENT:   Head: Normocephalic and atraumatic.   Nose: Nose normal.            Abdominal: Soft. She exhibits no distension and no mass. There is abdominal tenderness. There is no rebound and no guarding.             Musculoskeletal: Normal range of motion and moves all extremeties. No edema.       Neurological: She is alert and oriented to person, place, and time. No cranial nerve deficit.     Psychiatric: She has a normal mood and affect. Her behavior is normal. Judgment and thought content normal.            Assessment:      1. Pelvic pain    2. Menorrhagia with regular cycle    3. Dysmenorrhea          Plan:      Patient now with persistent pelvic pain that is affecting her daily life and causing her to miss work.  She has failed medical management been treated with multiple modalities that are inconsistent with her findings.  Ultrasounds have been normal short of a small hemorrhagic cyst.  Options discussed with patient since she has failed medical management and elects for surgical intervention via total laparoscopic hysterectomy with bilateral salpingectomy with ovarian preservation.  Will schedule surgery and have patient return for preop visit.  "

## 2022-03-07 ENCOUNTER — PATIENT MESSAGE (OUTPATIENT)
Dept: OBSTETRICS AND GYNECOLOGY | Facility: CLINIC | Age: 32
End: 2022-03-07
Payer: COMMERCIAL

## 2022-03-08 ENCOUNTER — TELEPHONE (OUTPATIENT)
Dept: OBSTETRICS AND GYNECOLOGY | Facility: CLINIC | Age: 32
End: 2022-03-08

## 2022-03-08 NOTE — TELEPHONE ENCOUNTER
Patient states she has not taken any Motrin or Aleve for pain. Patient was advised to take Motrin every 8 hours for pain per Benita Estrada NP. Patient verbalized understanding.

## 2022-03-08 NOTE — TELEPHONE ENCOUNTER
Please call patient and ask if she has taken any NSAIDs or ibuprofen as she and I previously discussed.

## 2022-03-08 NOTE — TELEPHONE ENCOUNTER
Pt called requesting a Rx for pain if possible. Surgery is scheduled for 4/12. Pt states she has only taken OTC Tylenol.

## 2022-03-09 ENCOUNTER — PATIENT MESSAGE (OUTPATIENT)
Dept: OBSTETRICS AND GYNECOLOGY | Facility: CLINIC | Age: 32
End: 2022-03-09
Payer: COMMERCIAL

## 2022-03-10 ENCOUNTER — TELEPHONE (OUTPATIENT)
Dept: OBSTETRICS AND GYNECOLOGY | Facility: CLINIC | Age: 32
End: 2022-03-10
Payer: COMMERCIAL

## 2022-03-10 NOTE — TELEPHONE ENCOUNTER
Pt would like to discuss having the hysterectomy surgery done at an earlier date due to mild bleeding and severe pain with cramps.

## 2022-03-11 NOTE — TELEPHONE ENCOUNTER
Pt was advised keep apt and go to the ER as soon as she could.        ----- Message from Bibi Munoz sent at 3/7/2022  1:27 PM CST -----  Contact: pt  Type: Needs Medical Advice    Who Called:  the patient  Best Call Back Number: [phone]  Additional Information: Requesting a call back regarding wanting to see if her appt can be moved up, the pain is getting to her and don't think she can wait that long. Please call pt   Please Advise ---Thank you

## 2022-03-14 ENCOUNTER — PATIENT MESSAGE (OUTPATIENT)
Dept: OBSTETRICS AND GYNECOLOGY | Facility: CLINIC | Age: 32
End: 2022-03-14
Payer: COMMERCIAL

## 2022-03-15 ENCOUNTER — TELEPHONE (OUTPATIENT)
Dept: OBSTETRICS AND GYNECOLOGY | Facility: CLINIC | Age: 32
End: 2022-03-15
Payer: COMMERCIAL

## 2022-03-15 ENCOUNTER — PATIENT MESSAGE (OUTPATIENT)
Dept: OBSTETRICS AND GYNECOLOGY | Facility: CLINIC | Age: 32
End: 2022-03-15
Payer: COMMERCIAL

## 2022-03-15 NOTE — TELEPHONE ENCOUNTER
----- Message from Bibi Munoz sent at 3/7/2022  1:27 PM CST -----  Contact: pt  Type: Needs Medical Advice    Who Called:  the patient  Best Call Back Number: [phone]  Additional Information: Requesting a call back regarding wanting to see if her appt can be moved up, the pain is getting to her and don't think she can wait that long. Please call pt   Please Advise ---Thank you

## 2022-03-16 ENCOUNTER — OFFICE VISIT (OUTPATIENT)
Dept: OBSTETRICS AND GYNECOLOGY | Facility: CLINIC | Age: 32
End: 2022-03-16
Payer: COMMERCIAL

## 2022-03-16 VITALS
HEIGHT: 66 IN | BODY MASS INDEX: 21.38 KG/M2 | SYSTOLIC BLOOD PRESSURE: 112 MMHG | DIASTOLIC BLOOD PRESSURE: 70 MMHG | WEIGHT: 133 LBS

## 2022-03-16 DIAGNOSIS — B37.9 YEAST DETECTED: ICD-10-CM

## 2022-03-16 DIAGNOSIS — N89.8 FOUL SMELLING VAGINAL DISCHARGE: Primary | ICD-10-CM

## 2022-03-16 DIAGNOSIS — Z11.3 SCREENING FOR STDS (SEXUALLY TRANSMITTED DISEASES): ICD-10-CM

## 2022-03-16 LAB
GARDNERELLA VAGINALIS: ABNORMAL
OTHER MICROSC. OBSERVATIONS: ABNORMAL
POC BACTERIAL VAGINOSIS: ABNORMAL
POC CLUE CELLS: POSITIVE
TRICHOMONAS, POC: NEGATIVE
YEAST WET PREP: POSITIVE

## 2022-03-16 PROCEDURE — 87491 CHLMYD TRACH DNA AMP PROBE: CPT | Mod: 59

## 2022-03-16 PROCEDURE — 87210 POCT WET PREP: ICD-10-PCS | Mod: QW,S$GLB,,

## 2022-03-16 PROCEDURE — 87591 N.GONORRHOEAE DNA AMP PROB: CPT

## 2022-03-16 PROCEDURE — 99213 OFFICE O/P EST LOW 20 MIN: CPT | Mod: 25,S$GLB,,

## 2022-03-16 PROCEDURE — 87210 SMEAR WET MOUNT SALINE/INK: CPT | Mod: QW,S$GLB,,

## 2022-03-16 PROCEDURE — 99213 PR OFFICE/OUTPT VISIT, EST, LEVL III, 20-29 MIN: ICD-10-PCS | Mod: 25,S$GLB,,

## 2022-03-16 PROCEDURE — 87481 CANDIDA DNA AMP PROBE: CPT | Mod: 59

## 2022-03-16 RX ORDER — FLUCONAZOLE 150 MG/1
150 TABLET ORAL DAILY
Qty: 1 TABLET | Refills: 0 | Status: SHIPPED | OUTPATIENT
Start: 2022-03-16 | End: 2022-03-17

## 2022-03-16 RX ORDER — METRONIDAZOLE 500 MG/1
500 TABLET ORAL 2 TIMES DAILY
Qty: 14 TABLET | Refills: 0 | Status: SHIPPED | OUTPATIENT
Start: 2022-03-16 | End: 2022-03-23
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
## 2022-03-17 DIAGNOSIS — N89.8 FOUL SMELLING VAGINAL DISCHARGE: ICD-10-CM

## 2022-03-17 LAB
BACTERIAL VAGINOSIS DNA: POSITIVE
CANDIDA GLABRATA DNA: NEGATIVE
CANDIDA KRUSEI DNA: NEGATIVE
CANDIDA RRNA VAG QL PROBE: NEGATIVE
T VAGINALIS RRNA GENITAL QL PROBE: NEGATIVE

## 2022-03-18 LAB
C TRACH DNA SPEC QL NAA+PROBE: NOT DETECTED
N GONORRHOEA DNA SPEC QL NAA+PROBE: NOT DETECTED

## 2022-03-31 ENCOUNTER — PATIENT MESSAGE (OUTPATIENT)
Dept: OBSTETRICS AND GYNECOLOGY | Facility: CLINIC | Age: 32
End: 2022-03-31
Payer: COMMERCIAL

## 2022-04-06 ENCOUNTER — OFFICE VISIT (OUTPATIENT)
Dept: OBSTETRICS AND GYNECOLOGY | Facility: CLINIC | Age: 32
End: 2022-04-06
Payer: COMMERCIAL

## 2022-04-06 VITALS — BODY MASS INDEX: 18.96 KG/M2 | HEIGHT: 66 IN | WEIGHT: 118 LBS

## 2022-04-06 DIAGNOSIS — R10.2 PELVIC PAIN: ICD-10-CM

## 2022-04-06 DIAGNOSIS — Z01.818 PRE-OP EXAM: Primary | ICD-10-CM

## 2022-04-06 LAB
B-HCG UR QL: NEGATIVE
CTP QC/QA: YES

## 2022-04-06 PROCEDURE — 81025 URINE PREGNANCY TEST: CPT | Mod: S$GLB,,, | Performed by: OBSTETRICS & GYNECOLOGY

## 2022-04-06 PROCEDURE — 99499 NO LOS: ICD-10-PCS | Mod: S$GLB,,, | Performed by: OBSTETRICS & GYNECOLOGY

## 2022-04-06 PROCEDURE — 99499 UNLISTED E&M SERVICE: CPT | Mod: S$GLB,,, | Performed by: OBSTETRICS & GYNECOLOGY

## 2022-04-06 PROCEDURE — 81025 POCT URINE PREGNANCY: ICD-10-PCS | Mod: S$GLB,,, | Performed by: OBSTETRICS & GYNECOLOGY

## 2022-04-06 NOTE — PROGRESS NOTES
Patient ID: Alla Andre is a 31 y.o. female.    Chief Complaint:  Pre-op Exam (Pt is here for a Pre Op exam [facility] w/BS on 22)      History of Present Illness  HPI  Patient with persistent dysmenorrhea and pelvic pain.  She has been treated multiple times for PID in for pelvic pain.  She states the pain is before during and after her periods.  She also reports significant pain with intercourse.  Recurrent STD testing has been negative.  The pain is affecting her daily life and she is requiring multiple treatments for pain secondary to it affecting her daily life.  Previous options were discussed and she is electing for hysterectomy at this time.  No new complaints.  Doing well otherwise.    GYN & OB History  Patient's last menstrual period was 2022.   Date of Last Pap: No result found    Past Medical History:   Diagnosis Date    Abnormal Pap smear of cervix      Past Surgical History:   Procedure Laterality Date    ABLATION      2021    TUBAL LIGATION       Review of patient's allergies indicates:   Allergen Reactions    Shellfish containing products Swelling     Other reaction(s): Swelling     Current Outpatient Medications on File Prior to Visit   Medication Sig Dispense Refill    norelgestromin-ethinyl estradiol (ORTHO EVRA) 150-35 mcg/24 hr Place 1 patch onto the skin every 7 days. 4 patch 11     No current facility-administered medications on file prior to visit.       OB History    Para Term  AB Living   1 1 1     1   SAB IAB Ectopic Multiple Live Births           1      # Outcome Date GA Lbr Roosevelt/2nd Weight Sex Delivery Anes PTL Lv   1 Term 11/15/92   3.26 kg (7 lb 3 oz) F Vag-Spont EPI  SILVIO       Review of Systems  Review of Systems   Constitutional: Negative for fatigue, fever and unexpected weight change.   HENT: Negative for nasal congestion.    Respiratory: Negative for cough and shortness of breath.    Cardiovascular: Negative for chest pain, palpitations and leg  "swelling.   Gastrointestinal: Negative for abdominal pain, constipation, diarrhea, nausea, vomiting and reflux.   Endocrine: Negative for diabetes, hair loss and hot flashes.   Genitourinary: Positive for dysmenorrhea, dyspareunia and pelvic pain. Negative for bladder incontinence, decreased libido, dysuria, hot flashes, menorrhagia, menstrual problem, vaginal discharge and vaginal dryness.   Musculoskeletal: Negative for arthralgias, back pain and myalgias.   Integumentary:  Negative for rash, acne, hair changes, mole/lesion, breast mass, nipple discharge, breast skin changes and breast tenderness.   Neurological: Negative for seizures, syncope and headaches.   Hematological: Negative for adenopathy. Does not bruise/bleed easily.   Psychiatric/Behavioral: Negative for depression and sleep disturbance. The patient is not nervous/anxious.    Breast: Negative for breast self exam, lump, mass, mastodynia, nipple discharge, skin changes and tenderness              Objective:   Ht 5' 6" (1.676 m)   Wt 53.5 kg (118 lb)   LMP 04/06/2022 Comment: Spotting with ablation  BMI 19.05 kg/m²        Physical Exam:   Constitutional: She is oriented to person, place, and time. She appears well-developed and well-nourished. No distress.    HENT:   Head: Normocephalic and atraumatic.   Nose: Nose normal.            Abdominal: Soft. She exhibits no distension and no mass. There is no abdominal tenderness.             Musculoskeletal: Normal range of motion and moves all extremeties. No edema.       Neurological: She is alert and oriented to person, place, and time. No cranial nerve deficit.     Psychiatric: She has a normal mood and affect. Her behavior is normal. Judgment and thought content normal.            Assessment:      1. Pre-op exam    2. Pelvic pain          Plan:      Orders Placed This Encounter   Procedures    POCT Urine Pregnancy        after options discussed again of surgery verses other medical management she " elects to continue with surgery.  Recommend total laparoscopic hysterectomy with bilateral salpingectomy.  Risks/benefits/alternatives of procedure were discussed at length including but not limited to the risk of bleeding, infection, damage surrounding tissue.  Patient expressed understand these risks and elects to proceed.  All questions answered.

## 2022-04-08 ENCOUNTER — TELEPHONE (OUTPATIENT)
Dept: OBSTETRICS AND GYNECOLOGY | Facility: CLINIC | Age: 32
End: 2022-04-08
Payer: COMMERCIAL

## 2022-04-08 RX ORDER — POTASSIUM CHLORIDE 20 MEQ/1
20 TABLET, EXTENDED RELEASE ORAL 2 TIMES DAILY
Qty: 14 TABLET | Refills: 0 | Status: SHIPPED | OUTPATIENT
Start: 2022-04-08 | End: 2022-06-10

## 2022-04-08 NOTE — TELEPHONE ENCOUNTER
Per Kinga Prather RN at [facility] Pt has a critical potassium level of 2.8. She is scheduled for surgery on 4/12/22.

## 2022-04-08 NOTE — PROGRESS NOTES
Critical potassium of 2.8 call from [facility].  Called in oral potassium and pt aware.

## 2022-04-08 NOTE — TELEPHONE ENCOUNTER
Per Dr. Gallardo Rx was called in. Spoke to Pt to make her aware and she stated Dr. Gallardo has already contacted her.

## 2022-04-12 ENCOUNTER — OUTSIDE PLACE OF SERVICE (OUTPATIENT)
Dept: OBSTETRICS AND GYNECOLOGY | Facility: CLINIC | Age: 32
End: 2022-04-12

## 2022-04-12 PROCEDURE — 58571 PR LAPAROSCOPY W TOT HYSTERECTUTERUS <=250 GRAM  W TUBE/OVARY: ICD-10-PCS | Mod: ,,, | Performed by: OBSTETRICS & GYNECOLOGY

## 2022-04-12 PROCEDURE — 58571 TLH W/T/O 250 G OR LESS: CPT | Mod: ,,, | Performed by: OBSTETRICS & GYNECOLOGY

## 2022-04-14 ENCOUNTER — PATIENT MESSAGE (OUTPATIENT)
Dept: OBSTETRICS AND GYNECOLOGY | Facility: CLINIC | Age: 32
End: 2022-04-14
Payer: COMMERCIAL

## 2022-04-18 ENCOUNTER — TELEPHONE (OUTPATIENT)
Dept: OBSTETRICS AND GYNECOLOGY | Facility: CLINIC | Age: 32
End: 2022-04-18
Payer: COMMERCIAL

## 2022-04-18 NOTE — TELEPHONE ENCOUNTER
I spoke to the patient galoshellie she stated she is going to [facility] ER now due to the pain in the right side and the bleeding at the right side corner the incision. I offered the patient a appt today and patient reused. I explained to the patient to call us back to schedule a appt. Patient verbalized understand and states she will ball to schedule a appt tomorrow.   .       ----- Message from Ginger Floyd sent at 4/18/2022  2:55 PM CDT -----  Type: Needs Medical Advice  Who Called:  pt  Symptoms (please be specific):    How long has patient had these symptoms:    Pharmacy name and phone #:    Best Call Back Number: [phone] (home)     Additional Information: pt states she is having some issues from her surgery. Pt states she is having sharp pain and feels like someone is poking her and having bleeding at the incision line. Pt states she has not had a bowel movement either since she left the hospital.

## 2022-04-20 ENCOUNTER — TELEPHONE (OUTPATIENT)
Dept: OBSTETRICS AND GYNECOLOGY | Facility: CLINIC | Age: 32
End: 2022-04-20
Payer: COMMERCIAL

## 2022-04-20 NOTE — TELEPHONE ENCOUNTER
Patient was advised on Dr. Gallardo Schedule and she decided to keep her appt     ----- Message from Karen Sharma sent at 4/20/2022 11:45 AM CDT -----  Type: Needs Medical Advice  Who Called:  Pt  Best Call Back Number: [phone]  Additional Information: Pt returning call to schedule sooner appt--please advise--Thank you

## 2022-04-25 ENCOUNTER — PATIENT MESSAGE (OUTPATIENT)
Dept: OBSTETRICS AND GYNECOLOGY | Facility: CLINIC | Age: 32
End: 2022-04-25
Payer: COMMERCIAL

## 2022-04-27 ENCOUNTER — OFFICE VISIT (OUTPATIENT)
Dept: OBSTETRICS AND GYNECOLOGY | Facility: CLINIC | Age: 32
End: 2022-04-27
Payer: COMMERCIAL

## 2022-04-27 VITALS
DIASTOLIC BLOOD PRESSURE: 60 MMHG | WEIGHT: 116.63 LBS | SYSTOLIC BLOOD PRESSURE: 106 MMHG | HEIGHT: 66 IN | BODY MASS INDEX: 18.74 KG/M2

## 2022-04-27 DIAGNOSIS — Z09 EXAMINATION FOLLOWING SURGERY: Primary | ICD-10-CM

## 2022-04-27 PROCEDURE — 99024 PR POST-OP FOLLOW-UP VISIT: ICD-10-PCS | Mod: S$GLB,,, | Performed by: OBSTETRICS & GYNECOLOGY

## 2022-04-27 PROCEDURE — 99024 POSTOP FOLLOW-UP VISIT: CPT | Mod: S$GLB,,, | Performed by: OBSTETRICS & GYNECOLOGY

## 2022-04-27 RX ORDER — OXYCODONE AND ACETAMINOPHEN 5; 325 MG/1; MG/1
1 TABLET ORAL EVERY 6 HOURS PRN
COMMUNITY
Start: 2022-04-12 | End: 2022-08-12 | Stop reason: SDUPTHER

## 2022-04-27 RX ORDER — OXYCODONE AND ACETAMINOPHEN 5; 325 MG/1; MG/1
1 TABLET ORAL EVERY 6 HOURS PRN
COMMUNITY
Start: 2022-04-16 | End: 2022-06-25

## 2022-04-27 RX ORDER — METRONIDAZOLE 500 MG/1
500 TABLET ORAL EVERY 12 HOURS
Qty: 14 TABLET | Refills: 0 | Status: SHIPPED | OUTPATIENT
Start: 2022-04-27 | End: 2022-05-04

## 2022-04-27 NOTE — PROGRESS NOTES
"Chief Complaint   Patient presents with    Post-op Evaluation     Pt is here for a Post Op evaluation [facility]**TLH w/BS on 4/12/22        History of Present Illness:   Pateint presents today  2 weeks postop, status post TLH w/ BS, with complaint of pain from incision site and brown/red bleeding for four days. Pain is controlled without any medications.    Pathology: Discussion of test results with performing physician    Past medical and surgical history reviewed.   I have reviewed the patient's medical history in detail and updated the computerized patient record.    Physical exam:  /60   Ht 5' 6" (1.676 m)   Wt 52.9 kg (116 lb 9.6 oz)   LMP 04/06/2022 Comment: Spotting with ablation  BMI 18.82 kg/m²   General:  Well-developed, well-nourished female in no acute distress  HEENT:  Normocephalic, atraumatic, extraocular muscles intact, moist mucous membranes  Breasts: deferred  Abdominal:  Soft, nontender, nondistended, incision clean dry and intact.   Extremities:  Warm, dry, no clubbing/cyanosis/edema  Neurological:  For cranial nerves 2-12 grossly intact   Dermatologic:  No rashes/lesions/bruising  Psychiatric:  Appropriate mood, affect, speech    Assessment:  Status post total laparoscopic hysterectomy    Plan:  Doing well.  Routine postop care.  Cleared from a surgery standpoint.  Return with questions.    "

## 2022-04-28 ENCOUNTER — PATIENT MESSAGE (OUTPATIENT)
Dept: OBSTETRICS AND GYNECOLOGY | Facility: CLINIC | Age: 32
End: 2022-04-28
Payer: COMMERCIAL

## 2022-05-09 ENCOUNTER — PATIENT MESSAGE (OUTPATIENT)
Dept: OBSTETRICS AND GYNECOLOGY | Facility: CLINIC | Age: 32
End: 2022-05-09
Payer: COMMERCIAL

## 2022-05-18 ENCOUNTER — OFFICE VISIT (OUTPATIENT)
Dept: OBSTETRICS AND GYNECOLOGY | Facility: CLINIC | Age: 32
End: 2022-05-18
Payer: COMMERCIAL

## 2022-05-18 VITALS
HEIGHT: 66 IN | DIASTOLIC BLOOD PRESSURE: 60 MMHG | SYSTOLIC BLOOD PRESSURE: 112 MMHG | BODY MASS INDEX: 18.99 KG/M2 | WEIGHT: 118.19 LBS

## 2022-05-18 DIAGNOSIS — Z09 EXAMINATION FOLLOWING SURGERY: Primary | ICD-10-CM

## 2022-05-18 PROCEDURE — 99024 POSTOP FOLLOW-UP VISIT: CPT | Mod: S$GLB,,, | Performed by: OBSTETRICS & GYNECOLOGY

## 2022-05-18 PROCEDURE — 99024 PR POST-OP FOLLOW-UP VISIT: ICD-10-PCS | Mod: S$GLB,,, | Performed by: OBSTETRICS & GYNECOLOGY

## 2022-05-18 RX ORDER — CLINDAMYCIN HYDROCHLORIDE 300 MG/1
300 CAPSULE ORAL 2 TIMES DAILY
Qty: 14 CAPSULE | Refills: 0 | Status: SHIPPED | OUTPATIENT
Start: 2022-05-18 | End: 2022-06-10

## 2022-05-18 NOTE — PROGRESS NOTES
Patient ID: Alla Andre is a 31 y.o. female.    Chief Complaint:  Follow-up (Pt is here for  a follow up  [facility]** TLH w/ BS on 22. Pt has been experiencing heavy bleeding , fishy odor ,and blood clots)      History of Present Illness  HPI  Patient doing well.  Reports she still having occasional medium size clots with a vaginal discharge with an odor.  She has no significant pain.  She has not been sexually active.  Bleeding is off and on.  The pain she is currently having is different than her pre surgery pain inconsistent with routine recovery.  No nausea or vomiting.  No fevers or chills.    GYN & OB History  No LMP recorded (lmp unknown).   Date of Last Pap: No result found    Past Medical History:   Diagnosis Date    Abnormal Pap smear of cervix      Past Surgical History:   Procedure Laterality Date    ABLATION      2021    TUBAL LIGATION       Review of patient's allergies indicates:   Allergen Reactions    Shellfish containing products Swelling     Other reaction(s): Swelling     Current Outpatient Medications on File Prior to Visit   Medication Sig Dispense Refill    norelgestromin-ethinyl estradiol (ORTHO EVRA) 150-35 mcg/24 hr Place 1 patch onto the skin every 7 days. (Patient not taking: Reported on 2022) 4 patch 11    oxyCODONE-acetaminophen (PERCOCET) 5-325 mg per tablet Take 1 tablet by mouth every 6 (six) hours as needed.      oxyCODONE-acetaminophen (PERCOCET) 5-325 mg per tablet Take 1 tablet by mouth every 6 (six) hours as needed.      potassium chloride SA (K-DUR,KLOR-CON) 20 MEQ tablet Take 1 tablet (20 mEq total) by mouth 2 (two) times daily. (Patient not taking: No sig reported) 14 tablet 0     No current facility-administered medications on file prior to visit.       OB History    Para Term  AB Living   1 1 1     1   SAB IAB Ectopic Multiple Live Births           1      # Outcome Date GA Lbr Roosevelt/2nd Weight Sex Delivery Anes PTL Lv   1 Term 11/15/92    "3.26 kg (7 lb 3 oz) F Vag-Spont EPI  SILVIO       Review of Systems  Review of Systems   Constitutional: Negative for fatigue, fever and unexpected weight change.   HENT: Negative for nasal congestion.    Respiratory: Negative for cough and shortness of breath.    Cardiovascular: Negative for chest pain, palpitations and leg swelling.   Gastrointestinal: Negative for abdominal pain, constipation, diarrhea, nausea, vomiting and reflux.   Endocrine: Negative for diabetes, hair loss and hot flashes.   Genitourinary: Positive for vaginal discharge. Negative for bladder incontinence, decreased libido, dysmenorrhea, dyspareunia, dysuria, hot flashes, menorrhagia, menstrual problem, pelvic pain and vaginal dryness.   Musculoskeletal: Negative for arthralgias, back pain and myalgias.   Integumentary:  Negative for rash, acne, hair changes, mole/lesion, breast mass, nipple discharge, breast skin changes and breast tenderness.   Neurological: Negative for seizures, syncope and headaches.   Hematological: Negative for adenopathy. Does not bruise/bleed easily.   Psychiatric/Behavioral: Negative for depression and sleep disturbance. The patient is not nervous/anxious.    Breast: Negative for breast self exam, lump, mass, mastodynia, nipple discharge, skin changes and tenderness              Objective:   /60   Ht 5' 6" (1.676 m)   Wt 53.6 kg (118 lb 3.2 oz)   LMP  (LMP Unknown)   BMI 19.08 kg/m²        Physical Exam:   Constitutional: She is oriented to person, place, and time. She appears well-developed and well-nourished. No distress.    HENT:   Head: Normocephalic and atraumatic.   Nose: Nose normal.            Abdominal: Soft. She exhibits no distension and no mass. There is no abdominal tenderness.             Musculoskeletal: Normal range of motion and moves all extremeties. No edema.       Neurological: She is alert and oriented to person, place, and time. No cranial nerve deficit.     Psychiatric: She has a normal " mood and affect. Her behavior is normal. Judgment and thought content normal.   :  Mild amount of yellowish discharge in the vagina.  Vaginal cuff healing well with small area of granulation tissue noted on the left side that was cauterized with silver nitrate.          Assessment:      1. Examination following surgery          Plan:      Will attempt course of clindamycin for bacterial vaginosis/postoperative odor.  No signs of infection.  Bleeding likely secondary to granulation tissue that was treated.  Return if no improvement.  Otherwise continue routine postop care.

## 2022-05-18 NOTE — LETTER
May 18, 2022      [facility] - Obstetrics [facility] Gynecology  [address] [place] [address]  Phone: [phone]  Fax: [phone]       Patient: Alla Andre   YOB: 1990  Date of Visit: 05/18/2022    To Whom It May Concern:    Tonio Andre  was at [facility] on 05/18/2022. The patient may return to work/school on 5/30/2022 with no restrictions. If you have any questions or concerns, or if I can be of further assistance, please do not hesitate to contact me.    Sincerely,      Yosvany Gallardo Jr., MD

## 2022-06-06 ENCOUNTER — PATIENT MESSAGE (OUTPATIENT)
Dept: OBSTETRICS AND GYNECOLOGY | Facility: CLINIC | Age: 32
End: 2022-06-06
Payer: COMMERCIAL

## 2022-06-10 ENCOUNTER — OFFICE VISIT (OUTPATIENT)
Dept: OBSTETRICS AND GYNECOLOGY | Facility: CLINIC | Age: 32
End: 2022-06-10
Payer: COMMERCIAL

## 2022-06-10 VITALS
HEIGHT: 66 IN | BODY MASS INDEX: 18.75 KG/M2 | WEIGHT: 116.69 LBS | SYSTOLIC BLOOD PRESSURE: 119 MMHG | DIASTOLIC BLOOD PRESSURE: 55 MMHG

## 2022-06-10 DIAGNOSIS — R10.2 PELVIC PAIN: ICD-10-CM

## 2022-06-10 DIAGNOSIS — N89.8 VAGINAL LESION: Primary | ICD-10-CM

## 2022-06-10 PROCEDURE — 99213 OFFICE O/P EST LOW 20 MIN: CPT | Mod: 24,S$GLB,, | Performed by: OBSTETRICS & GYNECOLOGY

## 2022-06-10 PROCEDURE — 99213 PR OFFICE/OUTPT VISIT, EST, LEVL III, 20-29 MIN: ICD-10-PCS | Mod: 24,S$GLB,, | Performed by: OBSTETRICS & GYNECOLOGY

## 2022-06-10 NOTE — PROGRESS NOTES
Patient ID: Alla Andre is a 31 y.o. female.    Chief Complaint:  Cramping (Patient is here for cramping and heavy bleeding.) and Vaginal Bleeding      History of Present Illness  HPI  Reports lower pelvic pain and vaginal bleeding.  She was seen for lower pelvic pain and had an abdominal ultrasound that was normal but per the patient showed some possible fluid around 1 ovary.  Does report her bleeding is mild to moderate.  She has not been sexually active.  She states is mostly when she wiped but does have bleeding on her pad.  No pain with her bleeding.  No fevers or chills.  She had hysterectomy for benign reasons and healed well which was over 2 months ago.    GYN & OB History  No LMP recorded (lmp unknown). Patient has had a hysterectomy.   Date of Last Pap: No result found    Past Medical History:   Diagnosis Date    Abnormal Pap smear of cervix      Past Surgical History:   Procedure Laterality Date    ABLATION      7/2021    HYSTERECTOMY      TUBAL LIGATION       Review of patient's allergies indicates:   Allergen Reactions    Shellfish containing products Swelling     Other reaction(s): Swelling     Current Outpatient Medications on File Prior to Visit   Medication Sig Dispense Refill    oxyCODONE-acetaminophen (PERCOCET) 5-325 mg per tablet Take 1 tablet by mouth every 6 (six) hours as needed.      oxyCODONE-acetaminophen (PERCOCET) 5-325 mg per tablet Take 1 tablet by mouth every 6 (six) hours as needed.      [DISCONTINUED] clindamycin (CLEOCIN) 300 MG capsule Take 1 capsule (300 mg total) by mouth 2 (two) times daily. (Patient not taking: Reported on 6/10/2022) 14 capsule 0    [DISCONTINUED] norelgestromin-ethinyl estradiol (ORTHO EVRA) 150-35 mcg/24 hr Place 1 patch onto the skin every 7 days. (Patient not taking: No sig reported) 4 patch 11    [DISCONTINUED] potassium chloride SA (K-DUR,KLOR-CON) 20 MEQ tablet Take 1 tablet (20 mEq total) by mouth 2 (two) times daily. (Patient not  "taking: No sig reported) 14 tablet 0     No current facility-administered medications on file prior to visit.       OB History    Para Term  AB Living   1 1 1     1   SAB IAB Ectopic Multiple Live Births           1      # Outcome Date GA Lbr Roosevelt/2nd Weight Sex Delivery Anes PTL Lv   1 Term 11/15/92   3.26 kg (7 lb 3 oz) F Vag-Spont EPI  SILVIO       Review of Systems  Review of Systems   Constitutional: Negative for fatigue, fever and unexpected weight change.   HENT: Negative for nasal congestion.    Respiratory: Negative for cough and shortness of breath.    Cardiovascular: Negative for chest pain, palpitations and leg swelling.   Gastrointestinal: Negative for abdominal pain, constipation, diarrhea, nausea, vomiting and reflux.   Endocrine: Negative for diabetes, hair loss and hot flashes.   Genitourinary: Positive for pelvic pain. Negative for bladder incontinence, decreased libido, dysmenorrhea, dyspareunia, dysuria, hot flashes, menorrhagia, menstrual problem, vaginal discharge and vaginal dryness.   Musculoskeletal: Negative for arthralgias, back pain and myalgias.   Integumentary:  Negative for rash, acne, hair changes, mole/lesion, breast mass, nipple discharge, breast skin changes and breast tenderness.   Neurological: Negative for seizures, syncope and headaches.   Hematological: Negative for adenopathy. Does not bruise/bleed easily.   Psychiatric/Behavioral: Negative for depression and sleep disturbance. The patient is not nervous/anxious.    Breast: Negative for breast self exam, lump, mass, mastodynia, nipple discharge, skin changes and tenderness              Objective:   BP (!) 119/55   Ht 5' 6" (1.676 m)   Wt 52.9 kg (116 lb 11.2 oz)   LMP  (LMP Unknown)   BMI 18.84 kg/m²        Physical Exam:   Constitutional: She is oriented to person, place, and time. She appears well-developed and well-nourished. No distress.    HENT:   Head: Normocephalic and atraumatic.   Nose: Nose normal.     "        Abdominal: Soft. She exhibits no distension and no mass. There is no abdominal tenderness.             Musculoskeletal: Normal range of motion and moves all extremeties. No edema.       Neurological: She is alert and oriented to person, place, and time. No cranial nerve deficit.     Psychiatric: She has a normal mood and affect. Her behavior is normal. Judgment and thought content normal.   Pelvic exam reveals granulation tissue at the vaginal cuff.  This was treated with silver nitrate.        Assessment:      1. Vaginal lesion    2. Pelvic pain          Plan:      Orders Placed This Encounter   Procedures    US OB/GYN Procedure ([facility])-Future     Standing Status:   Future     Standing Expiration Date:   6/10/2023     Order Specific Question:   Procedures to be performed:     Answer:   GYN US- Non pregnant     Comments:   pelvic pain     Order Specific Question:   Release to patient     Answer:   Immediate        return for retreatment with silver nitrate in 2 weeks.  Ultrasound for pelvic pain.  Recommend transvaginal ultrasound for evaluation of ovaries.  Plan discussed with patient expressed understanding.

## 2022-06-24 ENCOUNTER — OFFICE VISIT (OUTPATIENT)
Dept: OBSTETRICS AND GYNECOLOGY | Facility: CLINIC | Age: 32
End: 2022-06-24
Payer: COMMERCIAL

## 2022-06-24 VITALS
DIASTOLIC BLOOD PRESSURE: 60 MMHG | SYSTOLIC BLOOD PRESSURE: 102 MMHG | WEIGHT: 119 LBS | HEART RATE: 78 BPM | HEIGHT: 66 IN | BODY MASS INDEX: 19.13 KG/M2

## 2022-06-24 DIAGNOSIS — R10.2 PELVIC PAIN: Primary | ICD-10-CM

## 2022-06-24 DIAGNOSIS — N89.8 GRANULATION TISSUE OF VAGINAL CUFF: ICD-10-CM

## 2022-06-24 DIAGNOSIS — N83.201 RIGHT OVARIAN CYST: ICD-10-CM

## 2022-06-24 PROCEDURE — 99024 PR POST-OP FOLLOW-UP VISIT: ICD-10-PCS | Mod: S$GLB,,,

## 2022-06-24 PROCEDURE — 99024 POSTOP FOLLOW-UP VISIT: CPT | Mod: S$GLB,,,

## 2022-06-24 RX ORDER — IBUPROFEN 800 MG/1
800 TABLET ORAL EVERY 8 HOURS PRN
Qty: 30 TABLET | Refills: 0 | Status: SHIPPED | OUTPATIENT
Start: 2022-06-24 | End: 2022-07-04

## 2022-06-25 NOTE — PROGRESS NOTES
"SUBJECTIVE:       Chief Complaint: Vaginal Pain (Patient is here for a follow up visit. )    History of Present Illness: Sury Andre is a 31 y.o. female  presenting for ultrasound results and re-treatment of granulation tissue at vaginal cuff. Patient is s/p hysterectomy. Granulation tissue at vaginal cuff was previously treated with silver nitrate. She complains of continued right pelvic pain. Her ultrasound reveals a right ovarian cyst (hemorrhagic vs endomertrioma). She states she feels safe at home.    Review of Systems      OBJECTIVE:      /60   Pulse 78   Ht 5' 6" (1.676 m)   Wt 54 kg (119 lb)   LMP  (LMP Unknown)   BMI 19.21 kg/m²     Chaperone present.    Physical Exam:   Constitutional: She is oriented to person, place, and time. Vital signs are normal. She appears well-developed and well-nourished. She is cooperative.    HENT:   Head: Normocephalic and atraumatic.      Cardiovascular: Normal rate and regular rhythm.     Pulmonary/Chest: Effort normal.        Abdominal: Soft. There is abdominal tenderness in the right lower quadrant.     Genitourinary:    Inguinal canal normal.   Rectum:      No external hemorrhoid.      Pelvic exam was performed with patient supine.   The external female genitalia was normal.   No external genitalia lesions identified,Genitalia hair distrobution normal .   Labial bartholins normal.Vagina exhibits lesion (2 areas of granulation tissue at vaginal cuff). No  no vaginal discharge, tenderness or bleeding in the vagina.    Genitourinary Comments: 2 areas of granulation tissue at vaginal cuff treated with silver nitrate. Patient tolerated well.             Musculoskeletal: Moves all extremeties.      Right lower leg: No edema.      Left lower leg: No edema.       Neurological: She is alert and oriented to person, place, and time. GCS eye subscore is 4. GCS verbal subscore is 5. GCS motor subscore is 6.    Skin: Skin is warm and dry. Capillary refill takes " less than 2 seconds.    Psychiatric: Her speech is normal and behavior is normal. Mood, affect and thought content normal.         ASSESSMENT:       1. Pelvic pain    2. Right ovarian cyst    3. Granulation tissue of vaginal cuff          PLAN:     Pelvic pain  -     ibuprofen (ADVIL,MOTRIN) 800 MG tablet; Take 1 tablet (800 mg total) by mouth every 8 (eight) hours as needed for Pain.  Dispense: 30 tablet; Refill: 0    Right ovarian cyst  -     US Pelvis Comp with Transvag NON-OB (xpd; Future; Expected date: 07/24/2022    Granulation tissue of vaginal cuff    Discussed possible need for additional treatment of granulation tissue at vaginal cuff at next visit. Will repeat pelvic ultrasound in 4-6 weeks to follow-up on right ovarian cyst. PUD precautions given with ibuprofen use. Advised patient to take ibuprofen with food and begin Pepcid OTC daily if taking for longer than 10 days. Reassurance provided.  All questions answered.  Patient verbalized understanding.      Follow up in about 2 weeks (around 7/8/2022) for evaluation of symptoms.

## 2022-06-29 ENCOUNTER — PATIENT MESSAGE (OUTPATIENT)
Dept: OBSTETRICS AND GYNECOLOGY | Facility: CLINIC | Age: 32
End: 2022-06-29
Payer: COMMERCIAL

## 2022-07-01 ENCOUNTER — OFFICE VISIT (OUTPATIENT)
Dept: OBSTETRICS AND GYNECOLOGY | Facility: CLINIC | Age: 32
End: 2022-07-01
Payer: COMMERCIAL

## 2022-07-01 ENCOUNTER — PATIENT MESSAGE (OUTPATIENT)
Dept: OBSTETRICS AND GYNECOLOGY | Facility: CLINIC | Age: 32
End: 2022-07-01

## 2022-07-01 VITALS
SYSTOLIC BLOOD PRESSURE: 108 MMHG | WEIGHT: 119 LBS | HEIGHT: 67 IN | BODY MASS INDEX: 18.68 KG/M2 | DIASTOLIC BLOOD PRESSURE: 60 MMHG

## 2022-07-01 DIAGNOSIS — R10.2 PELVIC PAIN: Primary | ICD-10-CM

## 2022-07-01 DIAGNOSIS — N83.201 RIGHT OVARIAN CYST: ICD-10-CM

## 2022-07-01 PROCEDURE — 99213 OFFICE O/P EST LOW 20 MIN: CPT | Mod: 24,S$GLB,,

## 2022-07-01 PROCEDURE — 99213 PR OFFICE/OUTPT VISIT, EST, LEVL III, 20-29 MIN: ICD-10-PCS | Mod: 24,S$GLB,,

## 2022-07-01 RX ORDER — LEVOFLOXACIN 750 MG/1
750 TABLET ORAL
COMMUNITY
Start: 2022-06-30 | End: 2022-07-07

## 2022-07-01 RX ORDER — NAPROXEN 500 MG/1
500 TABLET ORAL
COMMUNITY
Start: 2022-06-30 | End: 2022-07-10

## 2022-07-05 ENCOUNTER — PATIENT MESSAGE (OUTPATIENT)
Dept: OBSTETRICS AND GYNECOLOGY | Facility: CLINIC | Age: 32
End: 2022-07-05
Payer: COMMERCIAL

## 2022-07-14 ENCOUNTER — PATIENT MESSAGE (OUTPATIENT)
Dept: OBSTETRICS AND GYNECOLOGY | Facility: CLINIC | Age: 32
End: 2022-07-14
Payer: COMMERCIAL

## 2022-07-19 ENCOUNTER — PATIENT MESSAGE (OUTPATIENT)
Dept: OBSTETRICS AND GYNECOLOGY | Facility: CLINIC | Age: 32
End: 2022-07-19
Payer: COMMERCIAL

## 2022-07-19 NOTE — PROGRESS NOTES
SUBJECTIVE:       Chief Complaint: Pelvic Pain ([place] [facility] [place]. )    History of Present Illness: Sury Andre is a 31 y.o. female  presenting for persistent complaint of right-sided pelvic pain. She was recently seen [place] [facility] [place]. She states she had a pelvic ultrasound. No ultrasound results available at this time.    Review of Systems   Constitutional: Negative for activity change, appetite change, chills, fatigue, fever and unexpected weight change.   HENT: Negative for nasal congestion, dental problem, ear pain, postnasal drip, rhinorrhea, sinus pressure/congestion, sneezing and sore throat.    Eyes: Negative for discharge, visual disturbance and eye dryness.   Respiratory: Negative for cough, chest tightness and shortness of breath.    Cardiovascular: Negative for chest pain, palpitations and leg swelling.   Gastrointestinal: Negative for abdominal distention, abdominal pain, change in bowel habit, constipation, diarrhea, nausea, vomiting, reflux and change in bowel habit.   Endocrine: Negative for cold intolerance, heat intolerance, polydipsia and polyuria.   Genitourinary: Positive for pelvic pain (right-sided). Negative for difficulty urinating, dyspareunia, dysuria, frequency, genital sores, hot flashes, menstrual irregularity, menstrual problem, urgency, vaginal bleeding, vaginal discharge, vaginal pain and vaginal dryness.   Musculoskeletal: Negative for back pain, myalgias, neck pain and neck stiffness.   Integumentary:  Negative for rash, breast mass, breast discharge and breast tenderness.   Allergic/Immunologic: Negative for environmental allergies and immunocompromised state.   Neurological: Negative for dizziness, syncope, weakness, light-headedness, numbness and headaches.   Hematological: Negative for adenopathy. Does not bruise/bleed easily.   Psychiatric/Behavioral: Negative for confusion, decreased concentration and sleep disturbance. The patient  "is not nervous/anxious.    Breast: Negative for mass and tenderness        OBJECTIVE:      /60   Ht 5' 7" (1.702 m)   Wt 54 kg (119 lb)   LMP  (LMP Unknown)   BMI 18.64 kg/m²     Physical Exam:   Constitutional: She is oriented to person, place, and time. Vital signs are normal. She appears well-developed and well-nourished. She is cooperative.    HENT:   Head: Normocephalic and atraumatic.      Cardiovascular: Normal rate and regular rhythm.     Pulmonary/Chest: Effort normal.                  Musculoskeletal: Moves all extremeties.      Right lower leg: No edema.      Left lower leg: No edema.       Neurological: She is alert and oriented to person, place, and time. GCS eye subscore is 4. GCS verbal subscore is 5. GCS motor subscore is 6.    Skin: Skin is warm and dry. Capillary refill takes less than 2 seconds.    Psychiatric: She has a normal mood and affect. Her speech is normal and behavior is normal. Mood, affect and thought content normal.         ASSESSMENT:       1. Pelvic pain    2. Right ovarian cyst          PLAN:     Pelvic pain    Right ovarian cyst     Continue with current treatment plan. Continue OTC NSAIDs as needed for pain. Repeat ultrasound scheduled in 2 weeks. Reassurance provided. All questions answered. Patient verbalized understanding.      Follow up for as scheduled for evaluation of symptoms with Dr. Gallardo.       "

## 2022-07-22 ENCOUNTER — OFFICE VISIT (OUTPATIENT)
Dept: OBSTETRICS AND GYNECOLOGY | Facility: CLINIC | Age: 32
End: 2022-07-22
Payer: COMMERCIAL

## 2022-07-22 ENCOUNTER — TELEPHONE (OUTPATIENT)
Dept: OBSTETRICS AND GYNECOLOGY | Facility: CLINIC | Age: 32
End: 2022-07-22

## 2022-07-22 VITALS
BODY MASS INDEX: 19.07 KG/M2 | SYSTOLIC BLOOD PRESSURE: 95 MMHG | DIASTOLIC BLOOD PRESSURE: 54 MMHG | RESPIRATION RATE: 18 BRPM | HEIGHT: 66 IN | OXYGEN SATURATION: 100 % | WEIGHT: 118.63 LBS | HEART RATE: 75 BPM

## 2022-07-22 DIAGNOSIS — R10.2 PELVIC PAIN: Primary | ICD-10-CM

## 2022-07-22 DIAGNOSIS — N83.201 RIGHT OVARIAN CYST: ICD-10-CM

## 2022-07-22 PROCEDURE — 99213 PR OFFICE/OUTPT VISIT, EST, LEVL III, 20-29 MIN: ICD-10-PCS | Mod: S$GLB,,, | Performed by: OBSTETRICS & GYNECOLOGY

## 2022-07-22 PROCEDURE — 99213 OFFICE O/P EST LOW 20 MIN: CPT | Mod: S$GLB,,, | Performed by: OBSTETRICS & GYNECOLOGY

## 2022-07-22 NOTE — PROGRESS NOTES
Patient ID: Alla Andre is a 31 y.o. female.    Chief Complaint:  Abdominal Pain (Patient is currently here for cramping and burning )      History of Present Illness  HPI  Patient here to discuss persistent lower right-sided pelvic pain.  Patient had hysterectomy for abnormal uterine bleeding and pelvic pain at which time both ovaries appeared normal.  She has been reporting persistent worsening right lower quadrant pain for the last 3 months and at 1 point had hemorrhagic cyst noted on the right ovary.  She has had multiple ultrasounds that show off and on cysts of the right ovary.  Left ovary has been within normal limits.  She denies any fevers or chills.  She reports the pain as a burning or cramping in nature.  She has been started on OCPs and Depo-Provera without any improvement.  She has been taking anti-inflammatories without any improvement.  She denies any pain with intercourse.  She states the pain is worse with movement and with palpation.  She has been to the ER for this pain as well and she is here now for follow-up.  She states she desires further treatment as this pain is deeply affecting her daily life.  She has no bladder or bowel complaints.    GYN & OB History  Patient's last menstrual period was 2018.   Date of Last Pap: No result found    Past Medical History:   Diagnosis Date    Abnormal Pap smear of cervix      Past Surgical History:   Procedure Laterality Date    ABLATION      2021    HYSTERECTOMY      TUBAL LIGATION       Review of patient's allergies indicates:   Allergen Reactions    Shellfish containing products Swelling     Other reaction(s): Swelling     Current Outpatient Medications on File Prior to Visit   Medication Sig Dispense Refill    oxyCODONE-acetaminophen (PERCOCET) 5-325 mg per tablet Take 1 tablet by mouth every 6 (six) hours as needed.       No current facility-administered medications on file prior to visit.       OB History    Para Term  " AB Living   1 1 1     1   SAB IAB Ectopic Multiple Live Births           1      # Outcome Date GA Lbr Roosevelt/2nd Weight Sex Delivery Anes PTL Lv   1 Term 11/15/92   3.26 kg (7 lb 3 oz) F Vag-Spont EPI  SILVIO       Review of Systems  Review of Systems   Constitutional: Negative for fatigue, fever and unexpected weight change.   HENT: Negative for nasal congestion.    Respiratory: Negative for cough and shortness of breath.    Cardiovascular: Negative for chest pain, palpitations and leg swelling.   Gastrointestinal: Negative for abdominal pain, constipation, diarrhea, nausea, vomiting and reflux.   Endocrine: Negative for diabetes, hair loss and hot flashes.   Genitourinary: Positive for pelvic pain. Negative for bladder incontinence, decreased libido, dysmenorrhea, dyspareunia, dysuria, hot flashes, menorrhagia, menstrual problem, vaginal discharge and vaginal dryness.   Musculoskeletal: Negative for arthralgias, back pain and myalgias.   Integumentary:  Negative for rash, acne, hair changes, mole/lesion, breast mass, nipple discharge, breast skin changes and breast tenderness.   Neurological: Negative for seizures, syncope and headaches.   Hematological: Negative for adenopathy. Does not bruise/bleed easily.   Psychiatric/Behavioral: Negative for depression and sleep disturbance. The patient is not nervous/anxious.    Breast: Negative for breast self exam, lump, mass, mastodynia, nipple discharge, skin changes and tenderness              Objective:   BP (!) 95/54   Pulse 75   Resp 18   Ht 5' 6" (1.676 m)   Wt 53.8 kg (118 lb 9.6 oz)   LMP 2018   SpO2 100%   BMI 19.14 kg/m²        Physical Exam:   Constitutional: She is oriented to person, place, and time. She appears well-developed and well-nourished. No distress.    HENT:   Head: Normocephalic and atraumatic.   Nose: Nose normal.            Abdominal: Soft. She exhibits no distension and no mass. There is abdominal tenderness.           "   Musculoskeletal: Normal range of motion and moves all extremeties. No edema.       Neurological: She is alert and oriented to person, place, and time. No cranial nerve deficit.     Psychiatric: She has a normal mood and affect. Her behavior is normal. Judgment and thought content normal.            Assessment:      1. Pelvic pain    2. Right ovarian cyst          Plan:      Discussed treatment options and possible etiology for pain.  Discussed the possibility of recurrent right ovarian cyst.  Most recent ultrasound showed resolution of her right hemorrhagic cyst however did show 2 cm simple cyst.  Discussed possibility of adhesions after hysterectomy and possibility of endometriosis as was seen on her hysterectomy.  Discussed options of Lupron verses surgical intervention since she has failed other medical management since she elects for surgical intervention via right oophorectomy and diagnostic laparoscopy.  Will schedule and have patient return for preop.

## 2022-07-22 NOTE — TELEPHONE ENCOUNTER
Notified patient that appointment time is for  9 am    ----- Message from Jessica Boss sent at 7/22/2022  8:38 AM CDT -----  Contact: PT  Type: Needs Medical Advice    Who Called: Pt  Best Call Back Number: [phone]  Additional  Information: Pt asking for a call back to know if she still need needs to come in for 9am or 10  Please Advise- Thank you

## 2022-07-25 ENCOUNTER — PATIENT MESSAGE (OUTPATIENT)
Dept: OBSTETRICS AND GYNECOLOGY | Facility: CLINIC | Age: 32
End: 2022-07-25
Payer: COMMERCIAL

## 2022-08-04 ENCOUNTER — PATIENT MESSAGE (OUTPATIENT)
Dept: OBSTETRICS AND GYNECOLOGY | Facility: CLINIC | Age: 32
End: 2022-08-04
Payer: COMMERCIAL

## 2022-08-09 ENCOUNTER — TELEPHONE (OUTPATIENT)
Dept: OBSTETRICS AND GYNECOLOGY | Facility: CLINIC | Age: 32
End: 2022-08-09
Payer: COMMERCIAL

## 2022-08-09 ENCOUNTER — PATIENT MESSAGE (OUTPATIENT)
Dept: OBSTETRICS AND GYNECOLOGY | Facility: CLINIC | Age: 32
End: 2022-08-09
Payer: COMMERCIAL

## 2022-08-09 NOTE — TELEPHONE ENCOUNTER
Per Kinga with [facility], preop labs need to be reviewed. WBC 2.9  Dr. Gallardo is in surgery all day today, he was sent a message to review. Labs have also been scanned into .

## 2022-08-10 ENCOUNTER — OUTSIDE PLACE OF SERVICE (OUTPATIENT)
Dept: OBSTETRICS AND GYNECOLOGY | Facility: CLINIC | Age: 32
End: 2022-08-10

## 2022-08-10 PROCEDURE — 58661 LAPAROSCOPY REMOVE ADNEXA: CPT | Mod: RT,,, | Performed by: OBSTETRICS & GYNECOLOGY

## 2022-08-10 PROCEDURE — 58661 PR LAP,RMV  ADNEXAL STRUCTURE: ICD-10-PCS | Mod: RT,,, | Performed by: OBSTETRICS & GYNECOLOGY

## 2022-08-11 ENCOUNTER — PATIENT MESSAGE (OUTPATIENT)
Dept: OBSTETRICS AND GYNECOLOGY | Facility: CLINIC | Age: 32
End: 2022-08-11
Payer: COMMERCIAL

## 2022-08-12 ENCOUNTER — PATIENT MESSAGE (OUTPATIENT)
Dept: OBSTETRICS AND GYNECOLOGY | Facility: CLINIC | Age: 32
End: 2022-08-12
Payer: COMMERCIAL

## 2022-08-12 ENCOUNTER — NURSE TRIAGE (OUTPATIENT)
Dept: ADMINISTRATIVE | Facility: CLINIC | Age: 32
End: 2022-08-12
Payer: COMMERCIAL

## 2022-08-12 RX ORDER — OXYCODONE AND ACETAMINOPHEN 5; 325 MG/1; MG/1
1 TABLET ORAL EVERY 6 HOURS PRN
Qty: 15 TABLET | Refills: 0 | Status: SHIPPED | OUTPATIENT
Start: 2022-08-12 | End: 2023-01-10

## 2022-08-12 NOTE — TELEPHONE ENCOUNTER
Pt calling, had surgery Wed. Surgery done at [facility]. Pt has been coughing for a few days and states incision is bleeding. Pt states she has been holding direct pressure for 10 minutes and bleeding is continuing. Advised pt to go to ED now for evaluation. Continue holding pressure with clean cloth or sterile gauze until seen by provider and have another adult drive. Pt verbalizes understanding and will go. Advised to call back with further concerns.    Reason for Disposition   [1] Bleeding from incision AND [2] won't stop after 10 minutes of direct pressure    Additional Information   Negative: [1] Major abdominal surgical incision AND [2] wound gaping open AND [3] visible internal organs   Negative: Sounds like a life-threatening emergency to the triager    Protocols used: POST-OP INCISION SYMPTOMS-A-AH

## 2022-08-13 ENCOUNTER — NURSE TRIAGE (OUTPATIENT)
Dept: ADMINISTRATIVE | Facility: CLINIC | Age: 32
End: 2022-08-13
Payer: COMMERCIAL

## 2022-08-14 NOTE — TELEPHONE ENCOUNTER
MS    PCP:  Dr. Young    S/P surgery to remove Rt ovary on 8/10.  C/O burning and pain to RLQ abdomen with coughing, no BM since surgery, T=100, and blood-tinged drainage drainage from belly button incision.  Per protocol, care advised is go tot he ED now.  Instructed to call for worsening/questions/concerns.  VU.    Reason for Disposition   [1] Widespread rash AND [2] bright red, sunburn-like    Additional Information   Negative: [1] Major abdominal surgical incision AND [2] wound gaping open AND [3] visible internal organs   Negative: Sounds like a life-threatening emergency to the triager   Negative: [1] Bleeding from incision AND [2] won't stop after 10 minutes of direct pressure    Protocols used: POST-OP INCISION SYMPTOMS AND TROBRJMOE-T-DC

## 2022-08-25 ENCOUNTER — PATIENT MESSAGE (OUTPATIENT)
Dept: OBSTETRICS AND GYNECOLOGY | Facility: CLINIC | Age: 32
End: 2022-08-25
Payer: COMMERCIAL

## 2022-08-25 ENCOUNTER — TELEPHONE (OUTPATIENT)
Dept: OBSTETRICS AND GYNECOLOGY | Facility: CLINIC | Age: 32
End: 2022-08-25
Payer: COMMERCIAL

## 2022-08-25 NOTE — TELEPHONE ENCOUNTER
----- Message from Jessica Boss sent at 8/25/2022  9:23 AM CDT -----  Contact: PT  Type: Needs Medical Advice    Who Called: PT  Best Call Back Number: [phone]  Additional  Information: Pt asking if her appt on 8/26/22 can be moved from 9am to 1pm  Please Advise- Thank you

## 2022-08-26 ENCOUNTER — OFFICE VISIT (OUTPATIENT)
Dept: OBSTETRICS AND GYNECOLOGY | Facility: CLINIC | Age: 32
End: 2022-08-26
Payer: COMMERCIAL

## 2022-08-26 VITALS
WEIGHT: 123 LBS | BODY MASS INDEX: 19.77 KG/M2 | SYSTOLIC BLOOD PRESSURE: 110 MMHG | HEIGHT: 66 IN | DIASTOLIC BLOOD PRESSURE: 60 MMHG

## 2022-08-26 DIAGNOSIS — Z09 EXAMINATION FOLLOWING SURGERY: Primary | ICD-10-CM

## 2022-08-26 PROCEDURE — 99024 PR POST-OP FOLLOW-UP VISIT: ICD-10-PCS | Mod: S$GLB,,, | Performed by: OBSTETRICS & GYNECOLOGY

## 2022-08-26 PROCEDURE — 99024 POSTOP FOLLOW-UP VISIT: CPT | Mod: S$GLB,,, | Performed by: OBSTETRICS & GYNECOLOGY

## 2022-08-26 NOTE — PROGRESS NOTES
"Chief Complaint   Patient presents with    Follow-up     Pt is here for a follow up Post Op SRG** RA LSC Right Oophorectomy 8/10/22        History of Present Illness:   Pateint presents today  3 weeks postop, status post laparoscopy, with complaint of none. Pain is controlled without any medications.    Pathology: Discussion of test results with performing physician    Past medical and surgical history reviewed.   I have reviewed the patient's medical history in detail and updated the computerized patient record.    Physical exam:  /60   Ht 5' 6" (1.676 m)   Wt 55.8 kg (123 lb)   LMP  (LMP Unknown)   BMI 19.85 kg/m²   General:  Well-developed, well-nourished female in no acute distress  HEENT:  Normocephalic, atraumatic, extraocular muscles intact, moist mucous membranes  Breasts: deferred  Abdominal:  Soft, nontender, nondistended, incision clean dry and intact.   Extremities:  Warm, dry, no clubbing/cyanosis/edema  Neurological:  For cranial nerves 2-12 grossly intact   Dermatologic:  No rashes/lesions/bruising  Psychiatric:  Appropriate mood, affect, speech    Assessment:  Status post laparoscopy    Plan:  Doing well.  Routine postop care.  Cleared from a surgery standpoint.  Return with questions.      "

## 2022-10-05 ENCOUNTER — HOSPITAL ENCOUNTER (EMERGENCY)
Facility: HOSPITAL | Age: 32
Discharge: HOME | End: 2022-10-06
Attending: EMERGENCY MEDICINE
Payer: COMMERCIAL

## 2022-10-05 DIAGNOSIS — G44.89 HEADACHE SYNDROME: Primary | ICD-10-CM

## 2022-10-05 PROCEDURE — 99284 EMERGENCY DEPT VISIT MOD MDM: CPT

## 2022-10-05 PROCEDURE — 99284 PR EMERGENCY DEPT VISIT,LEVEL IV: ICD-10-PCS | Mod: ,,, | Performed by: EMERGENCY MEDICINE

## 2022-10-05 PROCEDURE — 96372 THER/PROPH/DIAG INJ SC/IM: CPT

## 2022-10-05 PROCEDURE — 99284 EMERGENCY DEPT VISIT MOD MDM: CPT | Mod: ,,, | Performed by: EMERGENCY MEDICINE

## 2022-10-05 RX ORDER — KETOROLAC TROMETHAMINE 30 MG/ML
60 INJECTION, SOLUTION INTRAMUSCULAR; INTRAVENOUS
Status: COMPLETED | OUTPATIENT
Start: 2022-10-05 | End: 2022-10-06

## 2022-10-06 VITALS
WEIGHT: 293 LBS | OXYGEN SATURATION: 97 % | HEIGHT: 64 IN | TEMPERATURE: 98 F | RESPIRATION RATE: 18 BRPM | SYSTOLIC BLOOD PRESSURE: 141 MMHG | HEART RATE: 87 BPM | BODY MASS INDEX: 50.02 KG/M2 | DIASTOLIC BLOOD PRESSURE: 69 MMHG

## 2022-10-06 PROCEDURE — 63600175 PHARM REV CODE 636 W HCPCS: Performed by: EMERGENCY MEDICINE

## 2022-10-06 RX ADMIN — KETOROLAC TROMETHAMINE 60 MG: 30 INJECTION, SOLUTION INTRAMUSCULAR at 12:10

## 2022-10-06 NOTE — ED PROVIDER NOTES
Encounter Date: 10/5/2022    SCRIBE #1 NOTE: I, Jannie Romero, am scribing for, and in the presence of,  Christian Banuelos MD. I have scribed the entire note.     History     Chief Complaint   Patient presents with    Headache     Pt states she had a csection 9/12/22 and was told by dr beavers if headache continued to come to the er - pt states seh has been at [facility] since 1600       The patient is a 31 y.o. female that presents to the emergency department due to headaches. The patient explains that last month she had a baby and while in labor they tried giving her an epidural but they could never get it, so they put her under general anesthesia. The patient states that this has been going on ever since. The patient also states that she has been experiencing neck pain. The patient denies a fever, and does not have diabetes or high blood pressure. No other medical hx or symptoms were reported.     The history is provided by the patient. No  was used.   Review of patient's allergies indicates:  No Known Allergies  History reviewed. No pertinent past medical history.  History reviewed. No pertinent surgical history.  History reviewed. No pertinent family history.  Social History     Tobacco Use    Smoking status: Never    Smokeless tobacco: Never   Substance Use Topics    Drug use: Never     Review of Systems   Constitutional:  Negative for fever.   HENT: Negative.     Eyes: Negative.    Respiratory: Negative.     Cardiovascular: Negative.    Gastrointestinal: Negative.    Endocrine: Negative.    Genitourinary: Negative.    Musculoskeletal:  Positive for neck pain. Negative for back pain.   Allergic/Immunologic: Negative.    Neurological:  Positive for headaches.   Hematological: Negative.    Psychiatric/Behavioral: Negative.     All other systems reviewed and are negative.    Physical Exam     Initial Vitals [10/05/22 2216]   BP Pulse Resp Temp SpO2   (!) 152/75 87 18 98.7 °F (37.1 °C) 99 %      MAP        --         Physical Exam    Constitutional: She appears well-developed and well-nourished.   HENT:   Head: Normocephalic and atraumatic.   Right Ear: External ear normal.   Left Ear: External ear normal.   Nose: Nose normal.   Mouth/Throat: Oropharynx is clear and moist.   Eyes: Conjunctivae and EOM are normal. Pupils are equal, round, and reactive to light.   Neck: Neck supple.   Normal range of motion.  Cardiovascular:  Normal rate, regular rhythm, normal heart sounds and intact distal pulses.           Pulmonary/Chest: Breath sounds normal.   Abdominal: Abdomen is soft. Bowel sounds are normal.   Musculoskeletal:         General: Normal range of motion.      Cervical back: Normal range of motion and neck supple.     Neurological: She is alert and oriented to person, place, and time. She has normal strength and normal reflexes. She displays normal reflexes. No cranial nerve deficit or sensory deficit. GCS score is 15. GCS eye subscore is 4. GCS verbal subscore is 5. GCS motor subscore is 6.   Skin: Skin is warm and dry.   Psychiatric: She has a normal mood and affect. Her behavior is normal. Judgment and thought content normal.       ED Course   Procedures  Labs Reviewed - No data to display       Imaging Results    None          Medications   ketorolac injection 60 mg (60 mg Intramuscular Given 10/6/22 0006)     Medical Decision Making:   ED Management:  12:33 a.m. the patient states her headache has improved          Attending Attestation:           Physician Attestation for Scribe:  Physician Attestation Statement for Scribe #1: I, Christian Banuelos MD, reviewed documentation, as scribed by Jannie Romero in my presence, and it is both accurate and complete.                        Clinical Impression:   Final diagnoses:  [G44.89] Headache syndrome (Primary)      ED Disposition Condition    Discharge Stable          ED Prescriptions    None       Follow-up Information    None          Christian Banuelos,  MD  10/08/22 0523       Christian Banuelos MD  10/08/22 0524

## 2023-01-10 ENCOUNTER — OFFICE VISIT (OUTPATIENT)
Dept: OBSTETRICS AND GYNECOLOGY | Facility: CLINIC | Age: 33
End: 2023-01-10
Payer: MEDICAID

## 2023-01-10 VITALS
BODY MASS INDEX: 19.26 KG/M2 | DIASTOLIC BLOOD PRESSURE: 58 MMHG | WEIGHT: 122.69 LBS | SYSTOLIC BLOOD PRESSURE: 122 MMHG | HEIGHT: 67 IN

## 2023-01-10 DIAGNOSIS — Z90.721 HISTORY OF RIGHT OOPHORECTOMY: ICD-10-CM

## 2023-01-10 DIAGNOSIS — N95.1 PERIMENOPAUSAL VASOMOTOR SYMPTOMS: Primary | ICD-10-CM

## 2023-01-10 DIAGNOSIS — Z90.710 HISTORY OF HYSTERECTOMY: ICD-10-CM

## 2023-01-10 PROBLEM — N80.9 ENDOMETRIOSIS: Status: ACTIVE | Noted: 2023-01-10

## 2023-01-10 PROCEDURE — 3074F PR MOST RECENT SYSTOLIC BLOOD PRESSURE < 130 MM HG: ICD-10-PCS | Mod: CPTII,S$GLB,,

## 2023-01-10 PROCEDURE — 3008F PR BODY MASS INDEX (BMI) DOCUMENTED: ICD-10-PCS | Mod: CPTII,S$GLB,,

## 2023-01-10 PROCEDURE — 1159F MED LIST DOCD IN RCRD: CPT | Mod: CPTII,S$GLB,,

## 2023-01-10 PROCEDURE — 1160F RVW MEDS BY RX/DR IN RCRD: CPT | Mod: CPTII,S$GLB,,

## 2023-01-10 PROCEDURE — 3078F DIAST BP <80 MM HG: CPT | Mod: CPTII,S$GLB,,

## 2023-01-10 PROCEDURE — 3008F BODY MASS INDEX DOCD: CPT | Mod: CPTII,S$GLB,,

## 2023-01-10 PROCEDURE — 99212 PR OFFICE/OUTPT VISIT, EST, LEVL II, 10-19 MIN: ICD-10-PCS | Mod: S$GLB,,,

## 2023-01-10 PROCEDURE — 99212 OFFICE O/P EST SF 10 MIN: CPT | Mod: S$GLB,,,

## 2023-01-10 PROCEDURE — 3074F SYST BP LT 130 MM HG: CPT | Mod: CPTII,S$GLB,,

## 2023-01-10 PROCEDURE — 1160F PR REVIEW ALL MEDS BY PRESCRIBER/CLIN PHARMACIST DOCUMENTED: ICD-10-PCS | Mod: CPTII,S$GLB,,

## 2023-01-10 PROCEDURE — 3078F PR MOST RECENT DIASTOLIC BLOOD PRESSURE < 80 MM HG: ICD-10-PCS | Mod: CPTII,S$GLB,,

## 2023-01-10 PROCEDURE — 1159F PR MEDICATION LIST DOCUMENTED IN MEDICAL RECORD: ICD-10-PCS | Mod: CPTII,S$GLB,,

## 2023-01-11 NOTE — PROGRESS NOTES
SUBJECTIVE:       Chief Complaint: Hot Flashes    History of Present Illness: Sury Andre is a 32 y.o. female  presenting for new onset hot flashes, vaginal dryness, and night sweats. She denies painful intercourse and recent infection or fever. Patient is s/p hysterectomy, bilateral salpingectomy, and right oophorectomy with a history of endometriosis.    Review of Systems   Constitutional:  Negative for activity change, appetite change, chills, fatigue, fever and unexpected weight change.        Night sweats   HENT:  Negative for nasal congestion, dental problem, ear pain, postnasal drip, rhinorrhea, sinus pressure/congestion, sneezing and sore throat.    Eyes:  Negative for discharge, visual disturbance and eye dryness.   Respiratory:  Negative for cough, chest tightness and shortness of breath.    Cardiovascular:  Negative for chest pain, palpitations and leg swelling.   Gastrointestinal:  Negative for abdominal distention, abdominal pain, change in bowel habit, constipation, diarrhea, nausea, vomiting, reflux and change in bowel habit.   Endocrine: Negative for cold intolerance, heat intolerance, polydipsia and polyuria.   Genitourinary:  Positive for hot flashes and vaginal dryness. Negative for difficulty urinating, dyspareunia, dysuria, frequency, genital sores, pelvic pain, urgency, vaginal bleeding, vaginal discharge and vaginal pain.   Musculoskeletal:  Negative for back pain, myalgias, neck pain and neck stiffness.   Integumentary:  Negative for rash, breast mass, breast discharge and breast tenderness.   Allergic/Immunologic: Negative for environmental allergies and immunocompromised state.   Neurological:  Negative for dizziness, syncope, weakness, light-headedness, numbness and headaches.   Hematological:  Negative for adenopathy. Does not bruise/bleed easily.   Psychiatric/Behavioral:  Negative for confusion, decreased concentration and sleep disturbance. The patient is not  "nervous/anxious.    Breast: Negative for mass and tenderness      OBJECTIVE:      BP (!) 122/58   Ht 5' 7" (1.702 m)   Wt 55.7 kg (122 lb 11.2 oz)   LMP  (LMP Unknown)   BMI 19.22 kg/m²     Physical Exam:   Constitutional: She is oriented to person, place, and time. Vital signs are normal. She appears well-developed and well-nourished. She is cooperative.    HENT:   Head: Normocephalic and atraumatic.      Cardiovascular:  Normal rate and regular rhythm.             Pulmonary/Chest: Effort normal.                  Musculoskeletal: Moves all extremeties.      Right lower leg: No edema.      Left lower leg: No edema.       Neurological: She is alert and oriented to person, place, and time. GCS eye subscore is 4. GCS verbal subscore is 5. GCS motor subscore is 6.    Skin: Skin is warm and dry. Capillary refill takes less than 2 seconds.    Psychiatric: Her speech is normal and behavior is normal. Mood and thought content normal. She has a flat affect.       ASSESSMENT:       1. Perimenopausal vasomotor symptoms    2. History of right oophorectomy    3. History of hysterectomy          PLAN:     Perimenopausal vasomotor symptoms  -     Follicle stimulating hormone; Future; Expected date: 01/11/2023  -     Estradiol; Future; Expected date: 01/11/2023    History of right oophorectomy    History of hysterectomy    Evaluate for early perimenopausal vs. menopausal state given patient's history of right oophorectomy and rule out POF. Will obtain labs and reevaluate S/S.      Follow up in about 1 week (around 1/17/2023) for evaluation of symptoms.       "

## 2023-02-24 ENCOUNTER — PATIENT MESSAGE (OUTPATIENT)
Dept: OBSTETRICS AND GYNECOLOGY | Facility: CLINIC | Age: 33
End: 2023-02-24
Payer: MEDICAID

## 2023-03-02 ENCOUNTER — OFFICE VISIT (OUTPATIENT)
Dept: OBSTETRICS AND GYNECOLOGY | Facility: CLINIC | Age: 33
End: 2023-03-02
Payer: MEDICAID

## 2023-03-02 ENCOUNTER — LAB VISIT (OUTPATIENT)
Dept: LAB | Facility: CLINIC | Age: 33
End: 2023-03-02
Payer: MEDICAID

## 2023-03-02 VITALS
SYSTOLIC BLOOD PRESSURE: 108 MMHG | DIASTOLIC BLOOD PRESSURE: 58 MMHG | BODY MASS INDEX: 19.15 KG/M2 | HEIGHT: 66 IN | WEIGHT: 119.19 LBS

## 2023-03-02 DIAGNOSIS — N89.8 VAGINAL LESION: Primary | ICD-10-CM

## 2023-03-02 DIAGNOSIS — N95.1 PERIMENOPAUSAL VASOMOTOR SYMPTOMS: ICD-10-CM

## 2023-03-02 PROCEDURE — 99213 PR OFFICE/OUTPT VISIT, EST, LEVL III, 20-29 MIN: ICD-10-PCS | Mod: S$GLB,,, | Performed by: OBSTETRICS & GYNECOLOGY

## 2023-03-02 PROCEDURE — 1159F MED LIST DOCD IN RCRD: CPT | Mod: CPTII,S$GLB,, | Performed by: OBSTETRICS & GYNECOLOGY

## 2023-03-02 PROCEDURE — 3008F PR BODY MASS INDEX (BMI) DOCUMENTED: ICD-10-PCS | Mod: CPTII,S$GLB,, | Performed by: OBSTETRICS & GYNECOLOGY

## 2023-03-02 PROCEDURE — 1159F PR MEDICATION LIST DOCUMENTED IN MEDICAL RECORD: ICD-10-PCS | Mod: CPTII,S$GLB,, | Performed by: OBSTETRICS & GYNECOLOGY

## 2023-03-02 PROCEDURE — 3074F PR MOST RECENT SYSTOLIC BLOOD PRESSURE < 130 MM HG: ICD-10-PCS | Mod: CPTII,S$GLB,, | Performed by: OBSTETRICS & GYNECOLOGY

## 2023-03-02 PROCEDURE — 3078F PR MOST RECENT DIASTOLIC BLOOD PRESSURE < 80 MM HG: ICD-10-PCS | Mod: CPTII,S$GLB,, | Performed by: OBSTETRICS & GYNECOLOGY

## 2023-03-02 PROCEDURE — 3008F BODY MASS INDEX DOCD: CPT | Mod: CPTII,S$GLB,, | Performed by: OBSTETRICS & GYNECOLOGY

## 2023-03-02 PROCEDURE — 99213 OFFICE O/P EST LOW 20 MIN: CPT | Mod: S$GLB,,, | Performed by: OBSTETRICS & GYNECOLOGY

## 2023-03-02 PROCEDURE — 3078F DIAST BP <80 MM HG: CPT | Mod: CPTII,S$GLB,, | Performed by: OBSTETRICS & GYNECOLOGY

## 2023-03-02 PROCEDURE — 3074F SYST BP LT 130 MM HG: CPT | Mod: CPTII,S$GLB,, | Performed by: OBSTETRICS & GYNECOLOGY

## 2023-03-02 NOTE — PROGRESS NOTES
Patient ID: Alla Andre is a 32 y.o. female.    Chief Complaint:  Abdominal Pain (Pt is here for abdominal pain , hot flashes , vaginal irritation, and pain with intercourse for one month./)      History of Present Illness  HPI  Patient reports pain with intercourse during all times as well as vaginal dryness and vaginal discharge.  She does report some bleeding after intercourse as well.  The PD pain is consistently throughout intercourse not just with deep penetration or 1st insertion but continuous.  The bleeding afterwards is pink in nature.  She reports an odor to her discharge and has a history of bacterial vaginosis in the past.  She is sexually active and was screened for STDs by her PCP and was negative.  She was diagnosed with mononucleosis however.  She also reports significant symptoms of hot flashes and night sweats and vaginal dryness.  She is not attempted eating lubrication for intercourse but states she states DrLilia Draw the entire process.  She has has 1 ovary remaining and she has pain symptoms in her bilateral lower pelvis.  It radiates down her legs and is not continuous but occurs off and on.  Does occur sometimes with intercourse as well.    GYN & OB History  Patient's last menstrual period was 2018.   Date of Last Pap: No result found    Past Medical History:   Diagnosis Date    Abnormal Pap smear of cervix      Past Surgical History:   Procedure Laterality Date    ABLATION      2021    HYSTERECTOMY      LSC  08/10/2022    RA LSC Right Oophorectomy     TUBAL LIGATION       Review of patient's allergies indicates:   Allergen Reactions    Shellfish containing products Swelling     Other reaction(s): Swelling     No current outpatient medications on file prior to visit.     No current facility-administered medications on file prior to visit.       OB History    Para Term  AB Living   1 1 1     1   SAB IAB Ectopic Multiple Live Births           1      # Outcome Date GA  "Lbr Roosevelt/2nd Weight Sex Delivery Anes PTL Lv   1 Term 11/15/92   3.26 kg (7 lb 3 oz) F Vag-Spont EPI  SILVIO       Review of Systems  Review of Systems   Constitutional:  Negative for fatigue, fever and unexpected weight change.   HENT:  Negative for nasal congestion.    Respiratory:  Negative for cough and shortness of breath.    Cardiovascular:  Negative for chest pain, palpitations and leg swelling.   Gastrointestinal:  Negative for abdominal pain, constipation, diarrhea, nausea, vomiting and reflux.   Endocrine: Negative for diabetes, hair loss and hot flashes.   Genitourinary:  Positive for dyspareunia, vaginal discharge, postcoital bleeding, vaginal dryness and vaginal odor. Negative for bladder incontinence, decreased libido, dysmenorrhea, dysuria, hot flashes, menorrhagia, menstrual problem and pelvic pain.   Musculoskeletal:  Negative for arthralgias, back pain and myalgias.   Integumentary:  Negative for rash, acne, hair changes, mole/lesion, breast mass, nipple discharge, breast skin changes and breast tenderness.   Neurological:  Negative for seizures, syncope and headaches.   Hematological:  Negative for adenopathy. Does not bruise/bleed easily.   Psychiatric/Behavioral:  Negative for depression and sleep disturbance. The patient is not nervous/anxious.    Breast: Negative for breast self exam, lump, mass, mastodynia, nipple discharge, skin changes and tenderness            Objective:   BP (!) 108/58   Ht 5' 6" (1.676 m)   Wt 54.1 kg (119 lb 3.2 oz)   LMP 07/16/2018   BMI 19.24 kg/m²        Physical Exam:   Constitutional: She is oriented to person, place, and time. She appears well-developed and well-nourished. No distress.    HENT:   Head: Normocephalic and atraumatic.   Nose: Nose normal.            Abdominal: Soft. She exhibits no distension and no mass. There is no abdominal tenderness.             Musculoskeletal: Normal range of motion and moves all extremeties. No edema.       Neurological: She " is alert and oriented to person, place, and time. No cranial nerve deficit.     Psychiatric: She has a normal mood and affect. Her behavior is normal. Judgment and thought content normal.    Pelvic exam:  Granulation tissue noted the vaginal cuff.  This was treated with silver nitrate.        Assessment:      1. Vaginal lesion    2. Perimenopausal vasomotor symptoms          Plan:      Will obtain labs to evaluate for menopause including FSH and estradiol which were both ordered at a previous visit however she did not do it at that time.  Symptoms consistent with menopause however will follow up labs prior to treatment.  Granulation tissue noted and treated with silver nitrate.  Will have patient return in 3 weeks follow-up results.  Plan discussed with patient expressed understanding.

## 2023-03-03 LAB
ESTRADIOL SERPL-MCNC: 101 PG/ML
FSH SERPL-ACNC: 8.85 MIU/ML

## 2023-03-03 PROCEDURE — 82670 ASSAY OF TOTAL ESTRADIOL: CPT

## 2023-03-03 PROCEDURE — 83001 ASSAY OF GONADOTROPIN (FSH): CPT

## 2023-03-03 PROCEDURE — 36415 PR COLLECTION VENOUS BLOOD,VENIPUNCTURE: ICD-10-PCS | Mod: ,,, | Performed by: STUDENT IN AN ORGANIZED HEALTH CARE EDUCATION/TRAINING PROGRAM

## 2023-03-03 PROCEDURE — 36415 COLL VENOUS BLD VENIPUNCTURE: CPT | Mod: ,,, | Performed by: STUDENT IN AN ORGANIZED HEALTH CARE EDUCATION/TRAINING PROGRAM

## 2023-03-06 ENCOUNTER — PATIENT MESSAGE (OUTPATIENT)
Dept: OBSTETRICS AND GYNECOLOGY | Facility: CLINIC | Age: 33
End: 2023-03-06
Payer: MEDICAID

## 2023-04-11 ENCOUNTER — OFFICE VISIT (OUTPATIENT)
Dept: OBSTETRICS AND GYNECOLOGY | Facility: CLINIC | Age: 33
End: 2023-04-11
Payer: MEDICAID

## 2023-04-11 VITALS
SYSTOLIC BLOOD PRESSURE: 125 MMHG | WEIGHT: 119.81 LBS | HEART RATE: 79 BPM | DIASTOLIC BLOOD PRESSURE: 49 MMHG | BODY MASS INDEX: 19.25 KG/M2 | HEIGHT: 66 IN

## 2023-04-11 DIAGNOSIS — N80.9 ENDOMETRIOSIS: Primary | ICD-10-CM

## 2023-04-11 DIAGNOSIS — R10.2 PELVIC PAIN IN FEMALE: ICD-10-CM

## 2023-04-11 PROCEDURE — 1159F MED LIST DOCD IN RCRD: CPT | Mod: CPTII,S$GLB,, | Performed by: OBSTETRICS & GYNECOLOGY

## 2023-04-11 PROCEDURE — 3008F BODY MASS INDEX DOCD: CPT | Mod: CPTII,S$GLB,, | Performed by: OBSTETRICS & GYNECOLOGY

## 2023-04-11 PROCEDURE — 3078F DIAST BP <80 MM HG: CPT | Mod: CPTII,S$GLB,, | Performed by: OBSTETRICS & GYNECOLOGY

## 2023-04-11 PROCEDURE — 3078F PR MOST RECENT DIASTOLIC BLOOD PRESSURE < 80 MM HG: ICD-10-PCS | Mod: CPTII,S$GLB,, | Performed by: OBSTETRICS & GYNECOLOGY

## 2023-04-11 PROCEDURE — 3074F PR MOST RECENT SYSTOLIC BLOOD PRESSURE < 130 MM HG: ICD-10-PCS | Mod: CPTII,S$GLB,, | Performed by: OBSTETRICS & GYNECOLOGY

## 2023-04-11 PROCEDURE — 3074F SYST BP LT 130 MM HG: CPT | Mod: CPTII,S$GLB,, | Performed by: OBSTETRICS & GYNECOLOGY

## 2023-04-11 PROCEDURE — 3008F PR BODY MASS INDEX (BMI) DOCUMENTED: ICD-10-PCS | Mod: CPTII,S$GLB,, | Performed by: OBSTETRICS & GYNECOLOGY

## 2023-04-11 PROCEDURE — 99213 OFFICE O/P EST LOW 20 MIN: CPT | Mod: S$GLB,,, | Performed by: OBSTETRICS & GYNECOLOGY

## 2023-04-11 PROCEDURE — 99213 PR OFFICE/OUTPT VISIT, EST, LEVL III, 20-29 MIN: ICD-10-PCS | Mod: S$GLB,,, | Performed by: OBSTETRICS & GYNECOLOGY

## 2023-04-11 PROCEDURE — 1159F PR MEDICATION LIST DOCUMENTED IN MEDICAL RECORD: ICD-10-PCS | Mod: CPTII,S$GLB,, | Performed by: OBSTETRICS & GYNECOLOGY

## 2023-04-11 RX ORDER — ERGOCALCIFEROL 1.25 MG/1
50000 CAPSULE ORAL
COMMUNITY
Start: 2023-02-14

## 2023-04-11 RX ORDER — CETIRIZINE HYDROCHLORIDE 10 MG/1
10 TABLET ORAL
COMMUNITY
Start: 2023-03-29

## 2023-04-11 RX ORDER — BUSPIRONE HYDROCHLORIDE 5 MG/1
5 TABLET ORAL
COMMUNITY
Start: 2023-03-29 | End: 2023-04-12

## 2023-04-11 NOTE — PROGRESS NOTES
Patient ID: Alla Andre is a 32 y.o. female.    Chief Complaint:  Follow-up (Pt experiencing lft side pain since last visit.)      History of Present Illness  Gynecologic Exam  The patient's primary symptoms include genital itching, genital lesions, a genital odor, a genital rash and pelvic pain. The patient's pertinent negatives include no missed menses, vaginal bleeding or vaginal discharge. This is a chronic problem. The current episode started more than 1 month ago. The problem occurs 2 to 4 times per day. The problem has been rapidly worsening. The pain is severe. The problem affects both sides. She is not pregnant. Associated symptoms include abdominal pain and back pain. Pertinent negatives include no anorexia, chills, constipation, diarrhea, discolored urine, dysuria, fever, flank pain, frequency, headaches, hematuria, nausea, painful intercourse, rash, urgency or vomiting. The vaginal discharge was yellow. The vaginal bleeding is spotting. She has not been passing clots. She has not been passing tissue. The symptoms are aggravated by intercourse and tactile pressure. She has tried acetaminophen for the symptoms. The treatment provided no relief. She is sexually active. No, her partner does not have an STD. Her past medical history is significant for an abdominal surgery, endometriosis and ovarian cysts. There is no history of a  section, an ectopic pregnancy, a gynecological surgery, herpes simplex, menorrhagia, metrorrhagia, miscarriage, perineal abscess, PID, a terminated pregnancy or vaginosis.   Abdominal Pain  Patient presents for evaluation of abdominal pain. The pain is described as cramping, and is 8/10 in intensity. Pain is located in the LLQ area  abdomen . Onset was after pap smear , occurring 4 weeks ago. Symptoms have been unchanged since. Aggravating factors: movement. Alleviating factors: rest. Associated symptoms: nausea. The patient denies chills and fever. Risk factors for  pelvic/abdominal pain include prior pelvic surgery and endometriosis.  Patient states this pain is similar to her previous episodes of pain prior to having her right ovary removed secondary to endometriosis.  She states affecting her daily life she can not get up and function most days during the month.  She has not been to the ER or had an ultrasound.  No vaginal discharge currently.  She has a history of endometriosis and has undergone hysterectomy with right-sided oophorectomy for her pain.  She did have some improvement in pain after surgery has now returned.    GYN & OB History  Patient's last menstrual period was 2018.   Date of Last Pap: No result found    Past Medical History:   Diagnosis Date    Abnormal Pap smear of cervix      Past Surgical History:   Procedure Laterality Date    ABLATION      2021    HYSTERECTOMY      LSC  08/10/2022    RA LSC Right Oophorectomy     TUBAL LIGATION       Review of patient's allergies indicates:   Allergen Reactions    Shellfish containing products Swelling     Other reaction(s): Swelling     Current Outpatient Medications on File Prior to Visit   Medication Sig Dispense Refill    busPIRone (BUSPAR) 5 MG Tab 5 mg.      cetirizine (ZYRTEC) 10 MG tablet 10 mg.      ergocalciferol (ERGOCALCIFEROL) 50,000 unit Cap Take 50,000 Units by mouth twice a week.       No current facility-administered medications on file prior to visit.       OB History    Para Term  AB Living   1 1 1     1   SAB IAB Ectopic Multiple Live Births           1      # Outcome Date GA Lbr Roosevelt/2nd Weight Sex Delivery Anes PTL Lv   1 Term 11/15/92   3.26 kg (7 lb 3 oz) F Vag-Spont EPI  SILVIO       Review of Systems  Review of Systems   Constitutional:  Negative for chills, fatigue, fever and unexpected weight change.   HENT:  Negative for nasal congestion.    Respiratory:  Negative for cough and shortness of breath.    Cardiovascular:  Negative for chest pain, palpitations and leg  "swelling.   Gastrointestinal:  Positive for abdominal pain. Negative for anorexia, constipation, diarrhea, nausea, vomiting and reflux.   Endocrine: Negative for diabetes, hair loss and hot flashes.   Genitourinary:  Positive for pelvic pain. Negative for bladder incontinence, decreased libido, dysmenorrhea, dyspareunia, dysuria, flank pain, frequency, hematuria, hot flashes, menorrhagia, menstrual problem, missed menses, urgency, vaginal discharge and vaginal dryness.   Musculoskeletal:  Positive for back pain. Negative for arthralgias and myalgias.   Integumentary:  Negative for rash, acne, hair changes, mole/lesion, breast mass, nipple discharge, breast skin changes and breast tenderness.   Neurological:  Negative for seizures, syncope and headaches.   Hematological:  Negative for adenopathy. Does not bruise/bleed easily.   Psychiatric/Behavioral:  Negative for depression and sleep disturbance. The patient is not nervous/anxious.    Breast: Negative for breast self exam, lump, mass, mastodynia, nipple discharge, skin changes and tenderness            Objective:   BP (!) 125/49 (BP Location: Left arm, Patient Position: Sitting)   Pulse 79   Ht 5' 6" (1.676 m)   Wt 54.3 kg (119 lb 12.8 oz)   LMP 07/16/2018   BMI 19.34 kg/m²        Physical Exam:   Constitutional: She is oriented to person, place, and time. She appears well-developed and well-nourished. No distress.    HENT:   Head: Normocephalic and atraumatic.   Nose: Nose normal.            Abdominal: Soft. She exhibits no distension and no mass. There is abdominal tenderness. There is no rebound and no guarding.             Musculoskeletal: Normal range of motion and moves all extremeties. No edema.       Neurological: She is alert and oriented to person, place, and time. No cranial nerve deficit.     Psychiatric: She has a normal mood and affect. Her behavior is normal. Judgment and thought content normal.          Assessment:      1. Endometriosis    2. " Pelvic pain in female          Plan:      Pain discussed with patient likely endometriosis versus left ovarian cyst.  Recommend ultrasound for evaluation of left ovary.  Patient states the pain is significantly affecting her daily life and she desires treatment.  Recommend ultrasound to evaluate pain.  Discussed options of hormonal treatment with OCPs versus anti estrogens versus surgical intervention.  Patient states she would like to proceed with surgical intervention and possible oophorectomy.  Will schedule ultrasound and have patient return for further discussion and possible preop visit.

## 2023-04-12 ENCOUNTER — TELEPHONE (OUTPATIENT)
Dept: OBSTETRICS AND GYNECOLOGY | Facility: CLINIC | Age: 33
End: 2023-04-12
Payer: MEDICAID

## 2023-04-12 NOTE — TELEPHONE ENCOUNTER
Pt is requesting US order be sent to . Pt made aware Dr. Gallardo is not in clinic at the moment and a message will be sent to him . Please allow 24-48 hours for a reply. Pt advised full understanding.  ===================================================================        ----- Message from Bibi Munoz sent at 4/12/2023 10:20 AM CDT -----  Contact: pt  Pt is requesting orders to be put in    Orders Requested: ultra sound  Reason for the orders: ovary   Additional Information: was told at last appt he was ordering one for her    Please call pt to inform them the orders have been entered so that they are able to call and schedule.     Call Back #: call [phone]  Fax #:    Name of Company being Faxed: [employer]

## 2023-04-23 ENCOUNTER — PATIENT MESSAGE (OUTPATIENT)
Dept: OBSTETRICS AND GYNECOLOGY | Facility: CLINIC | Age: 33
End: 2023-04-23
Payer: MEDICAID

## 2023-04-25 ENCOUNTER — PROCEDURE VISIT (OUTPATIENT)
Dept: MATERNAL FETAL MEDICINE | Facility: CLINIC | Age: 33
End: 2023-04-25
Payer: MEDICAID

## 2023-04-25 DIAGNOSIS — N80.9 ENDOMETRIOSIS: ICD-10-CM

## 2023-04-25 DIAGNOSIS — R10.2 PELVIC PAIN IN FEMALE: ICD-10-CM

## 2023-04-25 PROCEDURE — 76830 TRANSVAGINAL US NON-OB: CPT | Mod: S$GLB,,, | Performed by: OBSTETRICS & GYNECOLOGY

## 2023-04-25 PROCEDURE — 76830 US OB/GYN PROCEDURE (VIEWPOINT): ICD-10-PCS | Mod: S$GLB,,, | Performed by: OBSTETRICS & GYNECOLOGY

## 2023-04-25 PROCEDURE — 76856 US EXAM PELVIC COMPLETE: CPT | Mod: S$GLB,,, | Performed by: OBSTETRICS & GYNECOLOGY

## 2023-04-25 PROCEDURE — 76856 US OB/GYN PROCEDURE (VIEWPOINT): ICD-10-PCS | Mod: S$GLB,,, | Performed by: OBSTETRICS & GYNECOLOGY

## 2023-04-30 ENCOUNTER — NURSE TRIAGE (OUTPATIENT)
Dept: ADMINISTRATIVE | Facility: CLINIC | Age: 33
End: 2023-04-30
Payer: MEDICAID

## 2023-05-01 NOTE — TELEPHONE ENCOUNTER
Reason for Disposition   [1] SEVERE pelvic pain AND [2] present > 1 hour    Additional Information   Negative: Shock suspected (e.g., cold/pale/clammy skin, too weak to stand, low BP, rapid pulse)   Negative: Passed out (i.e., lost consciousness, collapsed and was not responding)   Negative: Sounds like a life-threatening emergency to the triager   Negative: Menstrual cramps is main concern   Negative: Abdominal (stomach) pain is main concern   Negative: Vulvar (external genital area) pain or symptoms (e.g., dryness, sores, redness, blisters, bumps) is main concern   Negative: Rectal pain is main concern   Negative: Hip pain is main concern   Negative: Urination pain is main concern (pain with passing urine)   Negative: [1] Pelvic pain AND [2] pregnant < 20 weeks   Negative: [1] Pelvic pain AND [2] pregnant 20 or more weeks   Negative: Postpartum (from 0 to 6 weeks after delivery)   Negative: Pain associated with known hernia or new-onset hernia suspected (reducible bulge in groin/abdomen)   Negative: Followed an abdomen (stomach) injury   Negative: Followed a genital area injury (e.g., vagina, vulva)   Negative: [1] SEVERE pelvic pain (e.g., excruciating) AND [2] vomiting    Protocols used: Pelvic Pain - Female-A-AH  Pt states she has Fever back pain. Spotting and pelvic pain. Pt states she is not preg. Sx started over the weekend. T102.1 fever started this evening. rating pain = 9. Constant. No N/V. Rec ED. Pt agrees.

## 2023-05-09 ENCOUNTER — OFFICE VISIT (OUTPATIENT)
Dept: OBSTETRICS AND GYNECOLOGY | Facility: CLINIC | Age: 33
End: 2023-05-09
Payer: MEDICAID

## 2023-05-09 VITALS
DIASTOLIC BLOOD PRESSURE: 58 MMHG | WEIGHT: 118 LBS | HEIGHT: 66 IN | SYSTOLIC BLOOD PRESSURE: 101 MMHG | BODY MASS INDEX: 18.96 KG/M2

## 2023-05-09 DIAGNOSIS — N93.9 NONMENSTRUAL VAGINAL BLEEDING: Primary | ICD-10-CM

## 2023-05-09 DIAGNOSIS — R30.0 DYSURIA: ICD-10-CM

## 2023-05-09 LAB
BILIRUBIN, UA POC OHS: ABNORMAL
BLOOD, UA POC OHS: NEGATIVE
CLARITY, UA POC OHS: ABNORMAL
COLOR, UA POC OHS: ABNORMAL
GLUCOSE, UA POC OHS: NEGATIVE
KETONES, UA POC OHS: 40
LEUKOCYTES, UA POC OHS: NEGATIVE
NITRITE, UA POC OHS: NEGATIVE
PH, UA POC OHS: 6
PROTEIN, UA POC OHS: ABNORMAL
SPECIFIC GRAVITY, UA POC OHS: >=1.03
UROBILINOGEN, UA POC OHS: 0.2

## 2023-05-09 PROCEDURE — 99213 OFFICE O/P EST LOW 20 MIN: CPT | Mod: S$GLB,,, | Performed by: OBSTETRICS & GYNECOLOGY

## 2023-05-09 PROCEDURE — 3078F PR MOST RECENT DIASTOLIC BLOOD PRESSURE < 80 MM HG: ICD-10-PCS | Mod: CPTII,S$GLB,, | Performed by: OBSTETRICS & GYNECOLOGY

## 2023-05-09 PROCEDURE — 3008F BODY MASS INDEX DOCD: CPT | Mod: CPTII,S$GLB,, | Performed by: OBSTETRICS & GYNECOLOGY

## 2023-05-09 PROCEDURE — 3074F SYST BP LT 130 MM HG: CPT | Mod: CPTII,S$GLB,, | Performed by: OBSTETRICS & GYNECOLOGY

## 2023-05-09 PROCEDURE — 87086 URINE CULTURE/COLONY COUNT: CPT | Performed by: OBSTETRICS & GYNECOLOGY

## 2023-05-09 PROCEDURE — 1159F PR MEDICATION LIST DOCUMENTED IN MEDICAL RECORD: ICD-10-PCS | Mod: CPTII,S$GLB,, | Performed by: OBSTETRICS & GYNECOLOGY

## 2023-05-09 PROCEDURE — 3074F PR MOST RECENT SYSTOLIC BLOOD PRESSURE < 130 MM HG: ICD-10-PCS | Mod: CPTII,S$GLB,, | Performed by: OBSTETRICS & GYNECOLOGY

## 2023-05-09 PROCEDURE — 81003 URINALYSIS AUTO W/O SCOPE: CPT | Mod: QW,S$GLB,, | Performed by: OBSTETRICS & GYNECOLOGY

## 2023-05-09 PROCEDURE — 81003 POCT URINALYSIS(INSTRUMENT): ICD-10-PCS | Mod: QW,S$GLB,, | Performed by: OBSTETRICS & GYNECOLOGY

## 2023-05-09 PROCEDURE — 87591 N.GONORRHOEAE DNA AMP PROB: CPT | Performed by: OBSTETRICS & GYNECOLOGY

## 2023-05-09 PROCEDURE — 1159F MED LIST DOCD IN RCRD: CPT | Mod: CPTII,S$GLB,, | Performed by: OBSTETRICS & GYNECOLOGY

## 2023-05-09 PROCEDURE — 3008F PR BODY MASS INDEX (BMI) DOCUMENTED: ICD-10-PCS | Mod: CPTII,S$GLB,, | Performed by: OBSTETRICS & GYNECOLOGY

## 2023-05-09 PROCEDURE — 99213 PR OFFICE/OUTPT VISIT, EST, LEVL III, 20-29 MIN: ICD-10-PCS | Mod: S$GLB,,, | Performed by: OBSTETRICS & GYNECOLOGY

## 2023-05-09 PROCEDURE — 3078F DIAST BP <80 MM HG: CPT | Mod: CPTII,S$GLB,, | Performed by: OBSTETRICS & GYNECOLOGY

## 2023-05-09 RX ORDER — ESCITALOPRAM OXALATE 10 MG/1
10 TABLET ORAL
COMMUNITY
Start: 2023-04-04

## 2023-05-09 RX ORDER — SULFAMETHOXAZOLE AND TRIMETHOPRIM 800; 160 MG/1; MG/1
1 TABLET ORAL 2 TIMES DAILY
Qty: 10 TABLET | Refills: 0 | Status: SHIPPED | OUTPATIENT
Start: 2023-05-09 | End: 2023-05-14

## 2023-05-09 NOTE — PROGRESS NOTES
Patient ID: Alla Andre is a 32 y.o. female.    Chief Complaint:  Follow-up (Pt is here for a follow up  ER visit for back, pelvic pain, and light bleeding two weeks ago.)      History of Present Illness  Pelvic Pain  The patient's primary symptoms include pelvic pain. The patient's pertinent negatives include no vaginal discharge. This is a chronic problem. The current episode started in the past 7 days. The problem occurs constantly. The problem has been rapidly worsening. The pain is severe. The problem affects both sides. She is not pregnant. Associated symptoms include abdominal pain, back pain, discolored urine, dysuria, flank pain, frequency, nausea and urgency. Pertinent negatives include no anorexia, chills, constipation, diarrhea, fever, headaches, hematuria, painful intercourse, rash or vomiting. The symptoms are aggravated by activity, bowel movements, intercourse, tactile pressure and urinating. She has tried acetaminophen, heating pad, NSAIDs and warm bath for the symptoms. The treatment provided no relief. She is sexually active. No, her partner does not have an STD. She is postmenopausal. Her past medical history is significant for an abdominal surgery, endometriosis, a gynecological surgery and ovarian cysts. There is no history of a  section, an ectopic pregnancy, herpes simplex, menorrhagia, metrorrhagia, miscarriage, perineal abscess, PID, an STD, a terminated pregnancy or vaginosis.   Patient only recently went to ER secondary to vaginal spotting and pain with urination.  Workup was negative and she was told to follow-up with her OB gyn.  She states she is not had any spotting since then which was 2 weeks ago.  She has not been sexually active.  She denies any vaginal discharge currently but states she did have a little bit of a discharge and odor at that time.  They did not do a pelvic exam on her at that time.  She is scheduled to undergo surgery for endometriosis and pelvic  pain in regards to laparoscopic oophorectomy next month.  She states currently she is not spotting and her pain is unchanged from before.  No fevers or chills or back pain.    GYN & OB History  Patient's last menstrual period was 2018.   Date of Last Pap: No result found    Past Medical History:   Diagnosis Date    Abnormal Pap smear of cervix      Past Surgical History:   Procedure Laterality Date    ABLATION      2021    HYSTERECTOMY      LSC  08/10/2022    RA LSC Right Oophorectomy     TUBAL LIGATION       Review of patient's allergies indicates:   Allergen Reactions    Shellfish containing products Swelling     Other reaction(s): Swelling     Current Outpatient Medications on File Prior to Visit   Medication Sig Dispense Refill    EScitalopram oxalate (LEXAPRO) 10 MG tablet 10 mg.      busPIRone (BUSPAR) 5 MG Tab 5 mg.      cetirizine (ZYRTEC) 10 MG tablet 10 mg.      ergocalciferol (ERGOCALCIFEROL) 50,000 unit Cap Take 50,000 Units by mouth twice a week.       No current facility-administered medications on file prior to visit.       OB History    Para Term  AB Living   1 1 1     1   SAB IAB Ectopic Multiple Live Births           1      # Outcome Date GA Lbr Roosevelt/2nd Weight Sex Delivery Anes PTL Lv   1 Term 11/15/92   3.26 kg (7 lb 3 oz) F Vag-Spont EPI  SILVIO       Review of Systems  Review of Systems   Constitutional:  Negative for chills, fatigue, fever and unexpected weight change.   HENT:  Negative for nasal congestion.    Respiratory:  Negative for cough and shortness of breath.    Cardiovascular:  Negative for chest pain, palpitations and leg swelling.   Gastrointestinal:  Positive for abdominal pain and nausea. Negative for anorexia, constipation, diarrhea, vomiting and reflux.   Endocrine: Negative for diabetes, hair loss and hot flashes.   Genitourinary:  Positive for dysuria, flank pain, frequency, pelvic pain and urgency. Negative for bladder incontinence, decreased libido,  "dysmenorrhea, dyspareunia, hematuria, hot flashes, menorrhagia, menstrual problem, vaginal discharge and vaginal dryness.   Musculoskeletal:  Positive for back pain. Negative for arthralgias and myalgias.   Integumentary:  Negative for rash, acne, hair changes, mole/lesion, breast mass, nipple discharge, breast skin changes and breast tenderness.   Neurological:  Negative for seizures, syncope and headaches.   Hematological:  Negative for adenopathy. Does not bruise/bleed easily.   Psychiatric/Behavioral:  Negative for depression and sleep disturbance. The patient is not nervous/anxious.    Breast: Negative for breast self exam, lump, mass, mastodynia, nipple discharge, skin changes and tenderness            Objective:   BP (!) 101/58   Ht 5' 6" (1.676 m)   Wt 53.5 kg (118 lb)   LMP 07/16/2018   BMI 19.05 kg/m²        Physical Exam:   Constitutional: She is oriented to person, place, and time. She appears well-developed and well-nourished. No distress.    HENT:   Head: Normocephalic and atraumatic.   Nose: Nose normal.            Abdominal: Soft. She exhibits no distension and no mass. There is no abdominal tenderness.             Musculoskeletal: Normal range of motion and moves all extremeties. No edema.       Neurological: She is alert and oriented to person, place, and time. No cranial nerve deficit.     Psychiatric: She has a normal mood and affect. Her behavior is normal. Judgment and thought content normal.          Assessment:      1. Nonmenstrual vaginal bleeding    2. Dysuria          Plan:      Orders Placed This Encounter   Procedures    Urine culture          C. trachomatis/N. gonorrhoeae by AMP DNA [facility]; Urine     Sources by Resulting Lab:->[facility]     Order Specific Question:   Sources by Resulting Lab:     Answer:   [facility]     Order Specific Question:   Source:     Answer:   Urine    POCT Urinalysis(Instrument)         Patient declines pelvic exam today and states since no bleeding she is not " having any problems but they told her to follow-up so she is here.  Discussed possible reasons for vaginal bleeding after hysterectomy recommend close observation and return if any more bleeding occurs.  Will send urine culture and start antibiotic accordingly.  Return for preoperative visit as scheduled.

## 2023-05-10 ENCOUNTER — PATIENT MESSAGE (OUTPATIENT)
Dept: OBSTETRICS AND GYNECOLOGY | Facility: CLINIC | Age: 33
End: 2023-05-10
Payer: MEDICAID

## 2023-05-11 LAB
BACTERIA UR CULT: NORMAL
BACTERIA UR CULT: NORMAL

## 2023-05-12 LAB
C TRACH DNA SPEC QL NAA+PROBE: NOT DETECTED
N GONORRHOEA DNA SPEC QL NAA+PROBE: NOT DETECTED

## 2023-06-07 ENCOUNTER — OFFICE VISIT (OUTPATIENT)
Dept: OBSTETRICS AND GYNECOLOGY | Facility: CLINIC | Age: 33
End: 2023-06-07
Payer: MEDICAID

## 2023-06-07 VITALS
SYSTOLIC BLOOD PRESSURE: 112 MMHG | HEIGHT: 66 IN | BODY MASS INDEX: 19.13 KG/M2 | DIASTOLIC BLOOD PRESSURE: 64 MMHG | WEIGHT: 119 LBS

## 2023-06-07 DIAGNOSIS — R10.2 PELVIC PAIN IN FEMALE: Primary | ICD-10-CM

## 2023-06-07 DIAGNOSIS — N80.9 ENDOMETRIOSIS: ICD-10-CM

## 2023-06-07 PROCEDURE — 3078F DIAST BP <80 MM HG: CPT | Mod: CPTII,S$GLB,, | Performed by: OBSTETRICS & GYNECOLOGY

## 2023-06-07 PROCEDURE — 99213 PR OFFICE/OUTPT VISIT, EST, LEVL III, 20-29 MIN: ICD-10-PCS | Mod: S$GLB,,, | Performed by: OBSTETRICS & GYNECOLOGY

## 2023-06-07 PROCEDURE — 3008F BODY MASS INDEX DOCD: CPT | Mod: CPTII,S$GLB,, | Performed by: OBSTETRICS & GYNECOLOGY

## 2023-06-07 PROCEDURE — 1159F PR MEDICATION LIST DOCUMENTED IN MEDICAL RECORD: ICD-10-PCS | Mod: CPTII,S$GLB,, | Performed by: OBSTETRICS & GYNECOLOGY

## 2023-06-07 PROCEDURE — 1159F MED LIST DOCD IN RCRD: CPT | Mod: CPTII,S$GLB,, | Performed by: OBSTETRICS & GYNECOLOGY

## 2023-06-07 PROCEDURE — 99213 OFFICE O/P EST LOW 20 MIN: CPT | Mod: S$GLB,,, | Performed by: OBSTETRICS & GYNECOLOGY

## 2023-06-07 PROCEDURE — 3078F PR MOST RECENT DIASTOLIC BLOOD PRESSURE < 80 MM HG: ICD-10-PCS | Mod: CPTII,S$GLB,, | Performed by: OBSTETRICS & GYNECOLOGY

## 2023-06-07 PROCEDURE — 3074F PR MOST RECENT SYSTOLIC BLOOD PRESSURE < 130 MM HG: ICD-10-PCS | Mod: CPTII,S$GLB,, | Performed by: OBSTETRICS & GYNECOLOGY

## 2023-06-07 PROCEDURE — 3074F SYST BP LT 130 MM HG: CPT | Mod: CPTII,S$GLB,, | Performed by: OBSTETRICS & GYNECOLOGY

## 2023-06-07 PROCEDURE — 3008F PR BODY MASS INDEX (BMI) DOCUMENTED: ICD-10-PCS | Mod: CPTII,S$GLB,, | Performed by: OBSTETRICS & GYNECOLOGY

## 2023-06-07 NOTE — PROGRESS NOTES
Patient ID: Alla Andre is a 32 y.o. female.    Chief Complaint:  Pre-op Exam (Pt is here for a Pre op Exam evaluation )      History of Present Illness  HPI  Patient here for preoperative visit.  She is scheduled for robotic assisted laparoscopic left salpingo-oophorectomy.  She has a history of persistent prolonged/chronic pelvic pain.  She is already had hysterectomy and right oophorectomy secondary to endometriosis and pelvic pain.  She states even after this procedure the pain continues.  Is sharp in nature.  No vaginal discharge.  Pain is worse with intercourse.  Worse with movement.  Happens on a daily basis.  She states the pain deeply affects her daily life causing frequent hospital and Dr. Visits.  She desires definitive treatment at this time.  She was counseled regarding options and elects for surgical treatment.  She has no other complaints.    GYN & OB History  Patient's last menstrual period was 2018.   Date of Last Pap: No result found    Past Medical History:   Diagnosis Date    Abnormal Pap smear of cervix      Past Surgical History:   Procedure Laterality Date    ABLATION      2021    HYSTERECTOMY      LSC  08/10/2022    RA LSC Right Oophorectomy     TUBAL LIGATION       Review of patient's allergies indicates:   Allergen Reactions    Shellfish containing products Swelling     Other reaction(s): Swelling     Current Outpatient Medications on File Prior to Visit   Medication Sig Dispense Refill    EScitalopram oxalate (LEXAPRO) 10 MG tablet 10 mg.      busPIRone (BUSPAR) 5 MG Tab 5 mg.      cetirizine (ZYRTEC) 10 MG tablet 10 mg.      ergocalciferol (ERGOCALCIFEROL) 50,000 unit Cap Take 50,000 Units by mouth twice a week.       No current facility-administered medications on file prior to visit.       OB History    Para Term  AB Living   1 1 1     1   SAB IAB Ectopic Multiple Live Births           1      # Outcome Date GA Lbr Roosevelt/2nd Weight Sex Delivery Anes PTL Lv   1  "Term 11/15/92   3.26 kg (7 lb 3 oz) F Vag-Spont EPI  SILVIO       Review of Systems  Review of Systems   Constitutional:  Negative for fatigue, fever and unexpected weight change.   HENT:  Negative for nasal congestion.    Respiratory:  Negative for cough and shortness of breath.    Cardiovascular:  Negative for chest pain, palpitations and leg swelling.   Gastrointestinal:  Negative for abdominal pain, constipation, diarrhea, nausea, vomiting and reflux.   Endocrine: Negative for diabetes, hair loss and hot flashes.   Genitourinary:  Positive for dyspareunia and pelvic pain. Negative for bladder incontinence, decreased libido, dysmenorrhea, dysuria, hot flashes, menorrhagia, menstrual problem, vaginal discharge and vaginal dryness.   Musculoskeletal:  Negative for arthralgias, back pain and myalgias.   Integumentary:  Negative for rash, acne, hair changes, mole/lesion, breast mass, nipple discharge, breast skin changes and breast tenderness.   Neurological:  Negative for seizures, syncope and headaches.   Hematological:  Negative for adenopathy. Does not bruise/bleed easily.   Psychiatric/Behavioral:  Negative for depression and sleep disturbance. The patient is not nervous/anxious.    Breast: Negative for breast self exam, lump, mass, mastodynia, nipple discharge, skin changes and tenderness            Objective:   /64   Ht 5' 6" (1.676 m)   Wt 54 kg (119 lb)   LMP 07/16/2018   BMI 19.21 kg/m²        Physical Exam:   Constitutional: She is oriented to person, place, and time. She appears well-developed and well-nourished. No distress.    HENT:   Head: Normocephalic and atraumatic.   Nose: Nose normal.            Abdominal: Soft. She exhibits no distension and no mass. There is no abdominal tenderness.             Musculoskeletal: Normal range of motion and moves all extremeties. No edema.       Neurological: She is alert and oriented to person, place, and time. No cranial nerve deficit.     Psychiatric: She " has a normal mood and affect. Her behavior is normal. Judgment and thought content normal.          Assessment:      1. Pelvic pain in female    2. Endometriosis          Plan:      Recommend robotic assisted laparoscopic left salpingo oophorectomy for pelvic pain and endometriosis.  Discussed surgical menopause at her age and would recommend hormone replacement therapy after surgery but would consider a short time of no hormones to treat any potential remaining endometriosis.  Risks/benefits/alternatives of procedure were discussed at length including not limited to risk of bleeding, infection, damage surrounding tissue.  Patient expressed understanding is risks elects to proceed.  All questions answered.

## 2023-06-12 ENCOUNTER — PATIENT MESSAGE (OUTPATIENT)
Dept: OBSTETRICS AND GYNECOLOGY | Facility: CLINIC | Age: 33
End: 2023-06-12
Payer: MEDICAID

## 2023-06-12 ENCOUNTER — TELEPHONE (OUTPATIENT)
Dept: OBSTETRICS AND GYNECOLOGY | Facility: CLINIC | Age: 33
End: 2023-06-12
Payer: MEDICAID

## 2023-06-12 NOTE — TELEPHONE ENCOUNTER
----- Message from Kaelyn Lucas sent at 6/12/2023 12:03 PM CDT -----  Contact: [phone]  Type: Needs Medical Advice  Who Called:  Pt     Best Call Back Number: [phone]    Additional Information: Pt requesting a call back to discuss surgery. Pls call back and advise

## 2023-06-13 ENCOUNTER — PATIENT MESSAGE (OUTPATIENT)
Dept: RESEARCH | Facility: HOSPITAL | Age: 33
End: 2023-06-13
Payer: MEDICAID

## 2023-06-16 ENCOUNTER — TELEPHONE (OUTPATIENT)
Dept: OBSTETRICS AND GYNECOLOGY | Facility: CLINIC | Age: 33
End: 2023-06-16
Payer: MEDICAID

## 2023-06-16 NOTE — TELEPHONE ENCOUNTER
Pt was inquiring preop cost for surgery on 6/22/23. Pt advised to go to  [facility] off [address] to inquire before surgery. Pt advised full understanding and stated she would stop by today.    =====================================================================              ----- Message from Courtney Daugherty sent at 6/13/2023  3:57 PM CDT -----  Name of Who is Calling: RONNELL PRIDE [[id number]]       What is the request in detail: pt is requesting to speak with nurse that scheduled her surgery , pt did not wish to disclose reasoning        Can the clinic reply by [facility]:yes        What Number to Call Back if not in [facility]: [phone]

## 2023-06-21 ENCOUNTER — NURSE TRIAGE (OUTPATIENT)
Dept: ADMINISTRATIVE | Facility: CLINIC | Age: 33
End: 2023-06-21
Payer: MEDICAID

## 2023-06-22 ENCOUNTER — OUTSIDE PLACE OF SERVICE (OUTPATIENT)
Dept: OBSTETRICS AND GYNECOLOGY | Facility: CLINIC | Age: 33
End: 2023-06-22

## 2023-06-22 ENCOUNTER — PATIENT MESSAGE (OUTPATIENT)
Dept: OBSTETRICS AND GYNECOLOGY | Facility: CLINIC | Age: 33
End: 2023-06-22

## 2023-06-22 ENCOUNTER — NURSE TRIAGE (OUTPATIENT)
Dept: ADMINISTRATIVE | Facility: CLINIC | Age: 33
End: 2023-06-22
Payer: MEDICAID

## 2023-06-22 PROCEDURE — 58662 PR LAP,FULGURATE/EXCISE LESIONS: ICD-10-PCS | Mod: ,,, | Performed by: OBSTETRICS & GYNECOLOGY

## 2023-06-22 PROCEDURE — 58661 PR LAP,RMV  ADNEXAL STRUCTURE: ICD-10-PCS | Mod: 51,LT,, | Performed by: OBSTETRICS & GYNECOLOGY

## 2023-06-22 PROCEDURE — 58662 LAPAROSCOPY EXCISE LESIONS: CPT | Mod: ,,, | Performed by: OBSTETRICS & GYNECOLOGY

## 2023-06-22 PROCEDURE — 58661 LAPAROSCOPY REMOVE ADNEXA: CPT | Mod: 51,LT,, | Performed by: OBSTETRICS & GYNECOLOGY

## 2023-06-22 NOTE — TELEPHONE ENCOUNTER
Patient states she is suppose to have surgery in the morning and she is having diarrhea. She had 2 diarrhea stools yesterday and 2 today, liquid with a small amount of sediment. Care advice discussed, understanding verbalized. Patient states she is npo after midnight for surgery. She is asking if she will still have surgery since she had the 2 diarrhea stools today. Patient advised that she can try the imodium but diarrhea does not seem severe enough to cancel surgery and she is npo after midnight. Advised to call back if it gets worse. Please contact caller directly to discuss any further care advice.    Reason for Disposition   MILD-MODERATE diarrhea (e.g., 1-6 times / day more than normal)    Additional Information   Negative: Shock suspected (e.g., cold/pale/clammy skin, too weak to stand, low BP, rapid pulse)   Negative: Difficult to awaken or acting confused (e.g., disoriented, slurred speech)   Negative: Sounds like a life-threatening emergency to the triager   Negative: [1] SEVERE abdominal pain (e.g., excruciating) AND [2] present > 1 hour   Negative: [1] SEVERE abdominal pain AND [2] age > 60 years   Negative: [1] Blood in the stool AND [2] moderate or large amount of blood   Negative: Black or tarry bowel movements  (Exception: Chronic-unchanged black-grey BMs AND is taking iron pills or Pepto-Bismol.)   Negative: [1] Drinking very little AND [2] dehydration suspected (e.g., no urine > 12 hours, very dry mouth, very lightheaded)   Negative: Patient sounds very sick or weak to the triager   Negative: [1] SEVERE diarrhea (e.g., 7 or more times / day more than normal) AND [2] age > 60 years   Negative: [1] Constant abdominal pain AND [2] present > 2 hours   Negative: [1] Fever > 103 F (39.4 C) AND [2] not able to get the fever down using Fever Care Advice   Negative: [1] SEVERE diarrhea (e.g., 7 or more times / day more than normal) AND [2] present > 24 hours (1 day)   Negative: [1] MODERATE diarrhea (e.g.,  4-6 times / day more than normal) AND [2] present > 48 hours (2 days)   Negative: [1] MODERATE diarrhea (e.g., 4-6 times / day more than normal) AND [2] age > 70 years   Negative: Fever > 101 F (38.3 C)   Negative: Fever present > 3 days (72 hours)   Negative: Abdominal pain  (Exception: Pain clears with each passage of diarrhea stool.)   Negative: [1] Blood in the stool AND [2] small amount of blood  (Exception: Only on toilet paper. Reason: Diarrhea can cause rectal irritation with blood on wiping.)   Negative: [1] Mucus or pus in stool AND [2] present > 2 days AND [3] diarrhea is more than mild   Negative: [1] Recent antibiotic therapy (i.e., within last 2 months) AND [2] diarrhea present > 3 days since antibiotic was stopped   Negative: [1] Recent hospitalization AND [2] diarrhea present > 3 days   Negative: Weak immune system (e.g., HIV positive, cancer chemo, splenectomy, organ transplant, chronic steroids)   Negative: Tube feedings (e.g., nasogastric, g-tube, j-tube)   Negative: Travel to a foreign country in past month   Negative: [1] Mild diarrhea (e.g., 1-3 or more stools than normal in past 24 hours) without known cause AND [2] present >  7 days   Negative: Diarrhea is a chronic symptom (recurrent or ongoing AND present > 4 weeks)   Negative: SEVERE diarrhea (e.g., 7 or more times / day more than normal)    Protocols used: Diarrhea-A-

## 2023-06-23 NOTE — TELEPHONE ENCOUNTER
MS    PCP:  Sury De La Cruz, SELVIN NP-C    C/O upper abdominal growling, lower abdominal pain rated 8/10, pain with urination, Rt shoulder pain rated 10/10, CP, and fever (104).  She last ate yesterday.  Denies N/V/D, constipation, and difficulty breathing/swallowing.  She reports S/P Lt ovary removal today.  Denies GERD.  She reports wheezing after surgery that has resolved.  Per protocol, care advised is go to the ED now.  Pt VU.  Advised to call for worsening/questions/concerns.  VU.  Unable to route d/t non-[facility] Provider.      Reason for Disposition   Pain also in shoulder(s) or arm(s) or jaw  (Exception: Pain is clearly made worse by movement.)    Additional Information   Negative: Shock suspected (e.g., cold/pale/clammy skin, too weak to stand, low BP, rapid pulse)   Negative: Difficult to awaken or acting confused (e.g., disoriented, slurred speech)   Negative: Passed out (i.e., lost consciousness, collapsed and was not responding)   Negative: Sounds like a life-threatening emergency to the triager   Chest pain   Negative: SEVERE difficulty breathing (e.g., struggling for each breath, speaks in single words)   Negative: Difficult to awaken or acting confused (e.g., disoriented, slurred speech)   Negative: Shock suspected (e.g., cold/pale/clammy skin, too weak to stand, low BP, rapid pulse)   Negative: Passed out (i.e., lost consciousness, collapsed and was not responding)   Negative: [1] Chest pain lasts > 5 minutes AND [2] age > 44   Negative: [1] Chest pain lasts > 5 minutes AND [2] age > 30 AND [3] one or more cardiac risk factors (e.g., diabetes, high blood pressure, high cholesterol, smoker, or strong family history of heart disease)   Negative: [1] Chest pain lasts > 5 minutes AND [2] history of heart disease (i.e., angina, heart attack, heart failure, bypass surgery, takes nitroglycerin)   Negative: [1] Chest pain lasts > 5 minutes AND [2] described as crushing, pressure-like, or heavy   Negative: Heart  "beating < 50 beats per minute OR > 140 beats per minute   Negative: Visible sweat on face or sweat dripping down face   Negative: Sounds like a life-threatening emergency to the triager   Negative: SEVERE chest pain   Negative: [1] Chest pain (or "angina") comes and goes AND [2] is happening more often (increasing in frequency) or getting worse (increasing in severity)  (Exception: Chest pains that last only a few seconds.)    Protocols used: Abdominal Pain - Female-A-AH, Chest Pain-A-AH    "

## 2023-06-26 ENCOUNTER — TELEPHONE (OUTPATIENT)
Dept: OBSTETRICS AND GYNECOLOGY | Facility: CLINIC | Age: 33
End: 2023-06-26
Payer: MEDICAID

## 2023-06-26 NOTE — TELEPHONE ENCOUNTER
----- Message from Niki Dorsey sent at 6/26/2023 12:02 PM CDT -----  Contact: pt  Type: Needs Medical Advice         Who Called: pt  Best Call Back Number:[phone]  Additional Information: Requesting a call back regarding pt said she is having little bleeding and asking for office to call her   Please Advise- Thank you

## 2023-06-28 ENCOUNTER — OFFICE VISIT (OUTPATIENT)
Dept: OBSTETRICS AND GYNECOLOGY | Facility: CLINIC | Age: 33
End: 2023-06-28
Payer: MEDICAID

## 2023-06-28 VITALS
BODY MASS INDEX: 18.32 KG/M2 | WEIGHT: 114 LBS | HEIGHT: 66 IN | SYSTOLIC BLOOD PRESSURE: 117 MMHG | DIASTOLIC BLOOD PRESSURE: 69 MMHG

## 2023-06-28 DIAGNOSIS — Z09 EXAMINATION FOLLOWING SURGERY: Primary | ICD-10-CM

## 2023-06-28 DIAGNOSIS — E89.40 SURGICAL MENOPAUSE ON HORMONE REPLACEMENT THERAPY: ICD-10-CM

## 2023-06-28 DIAGNOSIS — Z79.890 SURGICAL MENOPAUSE ON HORMONE REPLACEMENT THERAPY: ICD-10-CM

## 2023-06-28 PROCEDURE — 1159F MED LIST DOCD IN RCRD: CPT | Mod: CPTII,S$GLB,, | Performed by: OBSTETRICS & GYNECOLOGY

## 2023-06-28 PROCEDURE — 3008F BODY MASS INDEX DOCD: CPT | Mod: CPTII,S$GLB,, | Performed by: OBSTETRICS & GYNECOLOGY

## 2023-06-28 PROCEDURE — 3078F PR MOST RECENT DIASTOLIC BLOOD PRESSURE < 80 MM HG: ICD-10-PCS | Mod: CPTII,S$GLB,, | Performed by: OBSTETRICS & GYNECOLOGY

## 2023-06-28 PROCEDURE — 1159F PR MEDICATION LIST DOCUMENTED IN MEDICAL RECORD: ICD-10-PCS | Mod: CPTII,S$GLB,, | Performed by: OBSTETRICS & GYNECOLOGY

## 2023-06-28 PROCEDURE — 3074F PR MOST RECENT SYSTOLIC BLOOD PRESSURE < 130 MM HG: ICD-10-PCS | Mod: CPTII,S$GLB,, | Performed by: OBSTETRICS & GYNECOLOGY

## 2023-06-28 PROCEDURE — 3074F SYST BP LT 130 MM HG: CPT | Mod: CPTII,S$GLB,, | Performed by: OBSTETRICS & GYNECOLOGY

## 2023-06-28 PROCEDURE — 3008F PR BODY MASS INDEX (BMI) DOCUMENTED: ICD-10-PCS | Mod: CPTII,S$GLB,, | Performed by: OBSTETRICS & GYNECOLOGY

## 2023-06-28 PROCEDURE — 99024 PR POST-OP FOLLOW-UP VISIT: ICD-10-PCS | Mod: S$GLB,,, | Performed by: OBSTETRICS & GYNECOLOGY

## 2023-06-28 PROCEDURE — 99024 POSTOP FOLLOW-UP VISIT: CPT | Mod: S$GLB,,, | Performed by: OBSTETRICS & GYNECOLOGY

## 2023-06-28 PROCEDURE — 3078F DIAST BP <80 MM HG: CPT | Mod: CPTII,S$GLB,, | Performed by: OBSTETRICS & GYNECOLOGY

## 2023-06-28 RX ORDER — ESTRADIOL 0.07 MG/D
1 FILM, EXTENDED RELEASE TRANSDERMAL
Qty: 8 PATCH | Refills: 11 | Status: SHIPPED | OUTPATIENT
Start: 2023-06-29 | End: 2024-06-28

## 2023-06-28 NOTE — PROGRESS NOTES
"Chief Complaint   Patient presents with    Post-op Evaluation     Pt is here for a post op evaluation  for Robotic Assisted Laparoscopic Left Oophorectomy and Ablation of Endometriosis         History of Present Illness:   Pateint presents today  1 weeks postop, status post Robotic Assisted Laparoscopic Left Oophorectomy and Ablation of Endometriosis . Pain is controlled without any medications.  Patient reports hot flashes and night sweats.    Pathology: Discussion of test results with performing physician    Past medical and surgical history reviewed.   I have reviewed the patient's medical history in detail and updated the computerized patient record.    Physical exam:  /69   Ht 5' 6" (1.676 m)   Wt 51.7 kg (114 lb)   LMP 07/16/2018   BMI 18.40 kg/m²   General:  Well-developed, well-nourished female in no acute distress  HEENT:  Normocephalic, atraumatic, extraocular muscles intact, moist mucous membranes  Breasts: deferred  Abdominal:  Soft, nontender, nondistended, incision clean dry and intact.   Extremities:  Warm, dry, no clubbing/cyanosis/edema  Neurological:  For cranial nerves 2-12 grossly intact   Dermatologic:  No rashes/lesions/bruising  Psychiatric:  Appropriate mood, affect, speech    Assessment:  Status post left oophorectomy    Plan:  Doing well.  Will start hormone replacement therapy.  Patient desires transdermal patch.    "

## 2023-07-05 ENCOUNTER — PATIENT MESSAGE (OUTPATIENT)
Dept: RESEARCH | Facility: HOSPITAL | Age: 33
End: 2023-07-05
Payer: MEDICAID

## 2023-07-11 ENCOUNTER — PATIENT MESSAGE (OUTPATIENT)
Dept: RESEARCH | Facility: HOSPITAL | Age: 33
End: 2023-07-11
Payer: MEDICAID

## 2024-01-08 ENCOUNTER — TELEPHONE (OUTPATIENT)
Dept: OBSTETRICS AND GYNECOLOGY | Facility: CLINIC | Age: 34
End: 2024-01-08
Payer: MEDICAID

## 2024-01-08 NOTE — TELEPHONE ENCOUNTER
----- Message from Delio Wheeler sent at 12/29/2023  3:59 PM CST -----  Contact: pt  Type:  Sooner Apoointment Request    Caller is requesting a sooner appointment.  Caller declined first available appointment listed below.  Caller will not accept being placed on the waitlist and is requesting a message be sent to doctor.  Name of Caller:pt  When is the first available appointment? N/a in epic  Symptoms:bleeding after sex  Would the patient rather a call back or a response via [facility]? call  Best Call Back Number:  Additional Information:

## 2025-06-29 ENCOUNTER — HOSPITAL ENCOUNTER (EMERGENCY)
Facility: HOSPITAL | Age: 35
Discharge: HOME | End: 2025-06-29
Attending: EMERGENCY MEDICINE
Payer: COMMERCIAL

## 2025-06-29 VITALS
BODY MASS INDEX: 47.63 KG/M2 | OXYGEN SATURATION: 100 % | WEIGHT: 279 LBS | HEART RATE: 89 BPM | DIASTOLIC BLOOD PRESSURE: 65 MMHG | SYSTOLIC BLOOD PRESSURE: 116 MMHG | HEIGHT: 64 IN | TEMPERATURE: 98 F | RESPIRATION RATE: 18 BRPM

## 2025-06-29 DIAGNOSIS — B02.9 HERPES ZOSTER WITHOUT COMPLICATION: Primary | ICD-10-CM

## 2025-06-29 PROCEDURE — 99284 EMERGENCY DEPT VISIT MOD MDM: CPT

## 2025-06-29 RX ORDER — GABAPENTIN 300 MG/1
300 CAPSULE ORAL 3 TIMES DAILY
Qty: 42 CAPSULE | Refills: 0 | Status: SHIPPED | OUTPATIENT
Start: 2025-06-29 | End: 2025-07-13

## 2025-06-29 RX ORDER — VALACYCLOVIR HYDROCHLORIDE 1 G/1
1000 TABLET, FILM COATED ORAL 3 TIMES DAILY
Qty: 15 TABLET | Refills: 0 | Status: SHIPPED | OUTPATIENT
Start: 2025-06-29 | End: 2025-07-04

## 2025-06-29 RX ORDER — VALACYCLOVIR HYDROCHLORIDE 1 G/1
1000 TABLET, FILM COATED ORAL 3 TIMES DAILY
Qty: 15 TABLET | Refills: 0 | Status: SHIPPED | OUTPATIENT
Start: 2025-06-29 | End: 2025-06-29

## 2025-06-29 RX ORDER — GABAPENTIN 300 MG/1
300 CAPSULE ORAL 3 TIMES DAILY
Qty: 42 CAPSULE | Refills: 0 | Status: SHIPPED | OUTPATIENT
Start: 2025-06-29 | End: 2025-06-29

## 2025-06-29 NOTE — ED PROVIDER NOTES
Encounter Date: 6/29/2025    SCRIBE #1 NOTE: I, Johanna Pérez, am scribing for, and in the presence of,  Deni Corona MD. I have scribed the entire note.       History     Chief Complaint   Patient presents with    Allergic Reaction     Pt presents to the ED with c/o allergic reaction. Pt states going to doctor yesterday for a spider bite on her scalp and was prescribed sulfamethoxazole. Pt took medication yesterday and woke up this morning with the left side of her face swollen .     Leti Nash is a 34 y.o. female presenting to the ED with c/o left sided facial swelling. PT reports she was seen yesterday for a spider bite on her head. She reports that today she woke up with facial swelling and pain. PT denies rash and itchiness. She denies use of topicals. PT has no past medical history on file.     The history is provided by the patient. No  was used.     Review of patient's allergies indicates:   Allergen Reactions    Penicillins      No past medical history on file.  No past surgical history on file.  No family history on file.  Social History[1]  Review of Systems   HENT:  Positive for facial swelling.    Skin:  Negative for rash.        PT denies itchiness.   All other systems reviewed and are negative.      Physical Exam     Initial Vitals [06/29/25 1112]   BP Pulse Resp Temp SpO2   116/65 89 18 97.8 °F (36.6 °C) 100 %      MAP       --         Physical Exam    Nursing note and vitals reviewed.  HENT:   Head: Normocephalic and atraumatic. Mouth/Throat: Oropharynx is clear and moist.   There are rashes and small blisters on the left forehead.    Eyes: Pupils are equal, round, and reactive to light.   Neck: Neck supple.   Normal range of motion.  Cardiovascular:  Normal rate and regular rhythm.           Pulmonary/Chest: Effort normal and breath sounds normal.   Abdominal: Abdomen is soft. She exhibits no distension.   Musculoskeletal:         General: Normal range of motion.       Cervical back: Normal range of motion and neck supple.     Neurological: She is alert.   Skin: Skin is warm. Capillary refill takes less than 2 seconds.   Psychiatric: She has a normal mood and affect.       ED Course   Procedures  Labs Reviewed - No data to display       Imaging Results    None          Medications - No data to display  Medical Decision Making  Risk  Prescription drug management.              Attending Attestation:           Physician Attestation for Scribe:  Physician Attestation Statement for Scribe #1: I, Deni Corona MD, reviewed documentation, as scribed by Johanna Pérez in my presence, and it is both accurate and complete.             ED Course as of 06/29/25 1659   Sun Jun 29, 2025   1148 Medical decision-making:  Differential diagnosis includes rash, allergic reaction, impetigo, cellulitis, shingles.  No labs or imaging were performed on this patient. [BB]      ED Course User Index  [BB] Deni Cornoa MD                           Clinical Impression:  Final diagnoses:  [B02.9] Herpes zoster without complication (Primary)          ED Disposition Condition    Discharge Stable          ED Prescriptions       Medication Sig Dispense Start Date End Date Auth. Provider    valACYclovir (VALTREX) 1000 MG tablet  (Status: Discontinued) Take 1 tablet (1,000 mg total) by mouth 3 (three) times daily. for 5 days 15 tablet 6/29/2025 6/29/2025 Deni Corona MD    gabapentin (NEURONTIN) 300 MG capsule  (Status: Discontinued) Take 1 capsule (300 mg total) by mouth 3 (three) times daily. for 14 days 42 capsule 6/29/2025 6/29/2025 Deni Corona MD    gabapentin (NEURONTIN) 300 MG capsule Take 1 capsule (300 mg total) by mouth 3 (three) times daily. for 14 days 42 capsule 6/29/2025 7/13/2025 Deni Corona MD    valACYclovir (VALTREX) 1000 MG tablet Take 1 tablet (1,000 mg total) by mouth 3 (three) times daily. for 5 days 15 tablet 6/29/2025 7/4/2025 Deni Corona MD          Follow-up Information     None                [1]   Social History  Tobacco Use    Smoking status: Never    Smokeless tobacco: Never   Substance Use Topics    Drug use: Never        Deni Corona MD  06/29/25 [id number]